# Patient Record
Sex: FEMALE | Race: WHITE | NOT HISPANIC OR LATINO | Employment: OTHER | URBAN - METROPOLITAN AREA
[De-identification: names, ages, dates, MRNs, and addresses within clinical notes are randomized per-mention and may not be internally consistent; named-entity substitution may affect disease eponyms.]

---

## 2017-01-20 ENCOUNTER — TELEPHONE (OUTPATIENT)
Dept: OBGYN | Facility: CLINIC | Age: 52
End: 2017-01-20

## 2017-01-20 NOTE — TELEPHONE ENCOUNTER
"Called pt called back. Pt stated she had her period last week and she started having a \"very light\" period flow again yesterday. Pt states this has no happened to her in past. Pt denies passing clots, abdominal pain, bloating or cramping. Pt was wondering if abnormal periods were normal during pre-menopause. Informed pt that abnormal periods do happen during pre-menopause and that she could come in to discuss her symptoms with a provider. Pt stated she would wait and see \"how things go\" over the weekend and would call back if she is still bleeding next week. Informed pt if her bleeding becomes heavy, she is passing multiple clots, or becomes dizzy or lightheaded to go into ER to be seen. Pt stated understanding and had no further questions.   "

## 2017-01-20 NOTE — TELEPHONE ENCOUNTER
Reason for Call:  Other call back    Detailed comments: Patient would like to discuss   Weird things happening with her cycle     Phone Number Patient can be reached at: Home number on file [phone] (home)    Best Time: Anytime    Can we leave a detailed message on this number? YES    Call taken on 1/20/2017 at 10:00 AM by Cornell Bateman

## 2017-04-03 ENCOUNTER — TELEPHONE (OUTPATIENT)
Dept: OBGYN | Facility: CLINIC | Age: 52
End: 2017-04-03

## 2017-04-03 NOTE — TELEPHONE ENCOUNTER
Pt wants referral for daughter Annita. Pt 's daughter is struggling with depression, working nights, and gone through a break-up. Pt is concerned and wants her seen as soon as possible. Reviewed with Dr. Wellington who recommended that pt's daughter look at her insurance and that is what dictates which counselor to see. Dr. Wellington also stated Fabienne See which was shared with pt.

## 2017-04-03 NOTE — TELEPHONE ENCOUNTER
No note about mental health referral in pt's chart. Note routed to Jaqueline farmer to place referral for pt?

## 2017-10-02 ENCOUNTER — RADIANT APPOINTMENT (OUTPATIENT)
Dept: BONE DENSITY | Facility: CLINIC | Age: 52
End: 2017-10-02
Payer: COMMERCIAL

## 2017-10-02 ENCOUNTER — RADIANT APPOINTMENT (OUTPATIENT)
Dept: MAMMOGRAPHY | Facility: CLINIC | Age: 52
End: 2017-10-02
Payer: COMMERCIAL

## 2017-10-02 ENCOUNTER — OFFICE VISIT (OUTPATIENT)
Dept: OBGYN | Facility: CLINIC | Age: 52
End: 2017-10-02
Payer: COMMERCIAL

## 2017-10-02 VITALS
BODY MASS INDEX: 19.12 KG/M2 | SYSTOLIC BLOOD PRESSURE: 106 MMHG | DIASTOLIC BLOOD PRESSURE: 56 MMHG | WEIGHT: 112 LBS | HEIGHT: 64 IN

## 2017-10-02 DIAGNOSIS — Z01.419 ENCOUNTER FOR GYNECOLOGICAL EXAMINATION WITHOUT ABNORMAL FINDING: Primary | ICD-10-CM

## 2017-10-02 DIAGNOSIS — Z78.0 ASYMPTOMATIC POSTMENOPAUSAL STATE: ICD-10-CM

## 2017-10-02 DIAGNOSIS — F33.1 MODERATE EPISODE OF RECURRENT MAJOR DEPRESSIVE DISORDER (H): ICD-10-CM

## 2017-10-02 DIAGNOSIS — Z12.31 VISIT FOR SCREENING MAMMOGRAM: ICD-10-CM

## 2017-10-02 PROCEDURE — G0202 SCR MAMMO BI INCL CAD: HCPCS | Mod: TC

## 2017-10-02 PROCEDURE — 87624 HPV HI-RISK TYP POOLED RSLT: CPT | Performed by: OBSTETRICS & GYNECOLOGY

## 2017-10-02 PROCEDURE — G0145 SCR C/V CYTO,THINLAYER,RESCR: HCPCS | Performed by: OBSTETRICS & GYNECOLOGY

## 2017-10-02 PROCEDURE — 77080 DXA BONE DENSITY AXIAL: CPT | Performed by: OBSTETRICS & GYNECOLOGY

## 2017-10-02 PROCEDURE — 99396 PREV VISIT EST AGE 40-64: CPT | Performed by: OBSTETRICS & GYNECOLOGY

## 2017-10-02 ASSESSMENT — ANXIETY QUESTIONNAIRES
6. BECOMING EASILY ANNOYED OR IRRITABLE: MORE THAN HALF THE DAYS
5. BEING SO RESTLESS THAT IT IS HARD TO SIT STILL: NOT AT ALL
3. WORRYING TOO MUCH ABOUT DIFFERENT THINGS: NOT AT ALL
2. NOT BEING ABLE TO STOP OR CONTROL WORRYING: NOT AT ALL
7. FEELING AFRAID AS IF SOMETHING AWFUL MIGHT HAPPEN: NOT AT ALL
GAD7 TOTAL SCORE: 2
IF YOU CHECKED OFF ANY PROBLEMS ON THIS QUESTIONNAIRE, HOW DIFFICULT HAVE THESE PROBLEMS MADE IT FOR YOU TO DO YOUR WORK, TAKE CARE OF THINGS AT HOME, OR GET ALONG WITH OTHER PEOPLE: SOMEWHAT DIFFICULT
1. FEELING NERVOUS, ANXIOUS, OR ON EDGE: NOT AT ALL

## 2017-10-02 ASSESSMENT — PATIENT HEALTH QUESTIONNAIRE - PHQ9
5. POOR APPETITE OR OVEREATING: NOT AT ALL
SUM OF ALL RESPONSES TO PHQ QUESTIONS 1-9: 0

## 2017-10-02 NOTE — LETTER
October 11, 2017    Lauren Stanley  [address] [address]    Dear Lauren,  We are happy to inform you that your PAP smear result from 10/2/17 is normal.  We are now able to do a follow up test on PAP smears. The DNA test is for HPV (Human Papilloma Virus). Cervical cancer is closely linked with certain types of HPV. Your result showed no evidence of high risk HPV.  Therefore we recommend you return in 3 years for your next pap smear.  You will still need to return to the clinic every year for an annual exam and other preventive tests.  Please contact the clinic at [phone] with any questions.  Sincerely,    Brandon Wellington MD/marin

## 2017-10-02 NOTE — MR AVS SNAPSHOT
"              After Visit Summary   10/2/2017    Lauren Stanley    MRN: [id number]           Patient Information     Date Of Birth          1965        Visit Information        Provider Department      10/2/2017 11:30 AM Brandon Wellington MD [facility] [place]        Today's Diagnoses     Encounter for gynecological examination without abnormal finding    -  1    Moderate episode of recurrent major depressive disorder (H)           Follow-ups after your visit        Who to contact     If you have questions or need follow up information about today's clinic visit or your schedule please contact [facility] directly at [phone].  Normal or non-critical lab and imaging results will be communicated to you by MyChart, letter or phone within 4 business days after the clinic has received the results. If you do not hear from us within 7 days, please contact the clinic through [employer] or phone. If you have a critical or abnormal lab result, we will notify you by phone as soon as possible.  Submit refill requests through [employer] or call your pharmacy and they will forward the refill request to us. Please allow 3 business days for your refill to be completed.          Additional Information About Your Visit        MyChart Information     [employer] lets you send messages to your doctor, view your test results, renew your prescriptions, schedule appointments and more. To sign up, go to [email or web address] . Click on \"Log in\" on the left side of the screen, which will take you to the Welcome page. Then click on \"Sign up Now\" on the right side of the page.     You will be asked to enter the access code listed below, as well as some personal information. Please follow the directions to create your username and password.     Your access code is: [email or web address]  Expires: 2018  8:35 AM     Your access code will  in 90 days. If you need help or a new code, please call your [place] " "clinic or [phone].        Care EveryWhere ID     This is your Care EveryWhere ID. This could be used by other organizations to access your [place] medical records  [id number]        Your Vitals Were     Height Last Period Breastfeeding? BMI (Body Mass Index)          5' 4\" (1.626 m) 09/17/2017 (Exact Date) No 19.22 kg/m2         Blood Pressure from Last 3 Encounters:   10/02/17 106/56   09/21/16 (!) 88/54   09/15/15 (!) 86/52    Weight from Last 3 Encounters:   10/02/17 112 lb (50.8 kg)   09/21/16 110 lb (49.9 kg)   09/15/15 109 lb (49.4 kg)              We Performed the Following     HPV High Risk Types DNA Cervical     Pap imaged thin layer screen with HPV - recommended age 30 - 65          Today's Medication Changes          These changes are accurate as of: 10/2/17 11:59 PM.  If you have any questions, ask your nurse or doctor.               These medicines have changed or have updated prescriptions.        Dose/Directions    sertraline 50 MG tablet   Commonly known as:  ZOLOFT   This may have changed:  how much to take   Used for:  Moderate episode of recurrent major depressive disorder (H)        Dose:  50 mg   Take 1 tablet (50 mg) by mouth daily   Quantity:  90 tablet   Refills:  3            Where to get your medicines      These medications were sent to [place] [id number] [place] [address]  [address] [place] [address] [facility] [address]     Phone:  [phone]     sertraline 50 MG tablet                Primary Care Provider    None Specified       No primary provider on file.        Equal Access to Services     [facility] : Hadpeyton Beckett, waarsalanda luqadaha, qaybta kaaljcarlos simpson. So [facility] [phone].    ATENCIÓN: Si habla español, tiene a polk disposición servicios gratuitos de asistencia lingüística. Llame al [phone].    We comply with applicable federal civil rights laws and [place] laws. We do not discriminate " on the basis of race, color, national origin, age, disability, sex, sexual orientation, or gender identity.            Thank you!     Thank you for choosing [facility]  for your care. Our goal is always to provide you with excellent care. Hearing back from our patients is one way we can continue to improve our services. Please take a few minutes to complete the written survey that you may receive in the mail after your visit with us. Thank you!             Your Updated Medication List - Protect others around you: Learn how to safely use, store and throw away your medicines at www.disposemymeds.org.          This list is accurate as of: 10/2/17 11:59 PM.  Always use your most recent med list.                   Brand Name Dispense Instructions for use Diagnosis    sertraline 50 MG tablet    ZOLOFT    90 tablet    Take 1 tablet (50 mg) by mouth daily    Moderate episode of recurrent major depressive disorder (H)

## 2017-10-02 NOTE — PROGRESS NOTES
Lauren is a 52 year old  female who presents for annual exam.     Besides routine health maintenance, she has no other health concerns today .    HPI: The patient is seen at this time for annual exam. Her only complaint is some definite Tory dysfunction with pressure symptoms but only having small pellets come out and incomplete emptying.  The patient does not have a PCP.        GYNECOLOGIC HISTORY:    Patient's last menstrual period was 2017 (exact date).  Her current contraception method is: none.  She  reports that she has never smoked. She has never used smokeless tobacco.      Patient is sexually active.  STD testing offered?  Declined  Last PHQ-9 score on record = PHQ-9 SCORE 10/2/2017   Total Score 0     Last GAD7 score on record =   ERNESTO-7 SCORE 10/2/2017   Total Score 2     Alcohol Score = 0    HEALTH MAINTENANCE:  Cholesterol:   Cholesterol   Date Value Ref Range Status   2014 197 115 - 199 mg/dL Final    NA  Last Mammo: one year ago, Result: normal, Next Mammo: today   Pap: (  Lab Results   Component Value Date    PAP OTHER-NIL, See Result 2016    PAP NIL 09/15/2015    9/21/16 WNL   Colonoscopy: 2-3 years ago with Dr. Jones , Result: normal, Next Colonoscopy: 5 years.  Dexa:  Today    Health maintenance updated:  yes    HISTORY:  Obstetric History       T0      L2     SAB0   TAB0   Ectopic0   Multiple0   Live Births2       # Outcome Date GA Lbr Beni/2nd Weight Sex Delivery Anes PTL Lv   2   35w0d    Vag-Spont   NONI   1   35w0d    Vag-Spont   NONI          Patient Active Problem List   Diagnosis     NO ACTIVE PROBLEMS     Past Surgical History:   Procedure Laterality Date     RECTOCELE REPAIR        Social History   Substance Use Topics     Smoking status: Never Smoker     Smokeless tobacco: Never Used     Alcohol use No      Problem (# of Occurrences) Relation (Name,Age of Onset)    Colon Cancer (1) Maternal Grandfather    DIABETES (1) Father        "Negative family history of: Coronary Artery Disease            Current Outpatient Prescriptions   Medication Sig     sertraline (ZOLOFT) 50 MG tablet Take 1 tablet (50 mg) by mouth daily (Patient taking differently: Take 25 mg by mouth daily )     No current facility-administered medications for this visit.      No Known Allergies    Past medical, surgical, social and family histories were reviewed and updated in EPIC.    ROS:   Gastrointestinal: Constipation    EXAM:  /56  Ht 5' 4\" (1.626 m)  Wt 112 lb (50.8 kg)  LMP 09/17/2017 (Exact Date)  Breastfeeding? No  BMI 19.22 kg/m2   BMI: Body mass index is 19.22 kg/(m^2).    PHYSICAL EXAM:  Constitutional:  Appearance: Well nourished, well developed, alert, in no acute distress  Neck:  Lymph Nodes:  No lymphadenopathy present    Thyroid:  Gland size normal, nontender, no nodules or masses present  on palpation  Chest:  Respiratory Effort:  Breathing unlabored  Cardiovascular:    Heart: Auscultation:  Regular rate, normal rhythm, no murmurs present  Breasts: Inspection of Breasts:  No lymphadenopathy present., Palpation of Breasts and Axillae:  No masses present on palpation, no breast tenderness., Axillary Lymph Nodes:  No lymphadenopathy present. and No nodularity, asymmetry or nipple discharge bilaterally.  Gastrointestinal:   Abdominal Examination:  Abdomen nontender to palpation, tone normal without rigidity or guarding, no masses present, umbilicus without lesions   Liver and Spleen:  No hepatomegaly present, liver nontender to palpation    Hernias:  No hernias present  Lymphatic: Lymph Nodes:  No other lymphadenopathy present  Skin:  General Inspection:  No rashes present, no lesions present, no areas of  discoloration    Genitalia and Groin:  No rashes present, no lesions present, no areas of  discoloration, no masses present  Neurologic/Psychiatric:    Mental Status:  Oriented X3     Pelvic Exam:  External Genitalia:     Normal appearance for age, no " discharge present, no tenderness present, no inflammatory lesions present, color normal  Vagina:     Normal vaginal vault without central or paravaginal defects, no discharge present, no inflammatory lesions present, no masses present  Bladder:     Nontender to palpation  Urethra:   Urethral Body:  Urethra palpation normal, urethra structural support normal   Urethral Meatus:  No erythema or lesions present  Cervix:     Appearance healthy, no lesions present, nontender to palpation, no bleeding present  Uterus:     Uterus: firm, normal sized and nontender, midplane in position.   Adnexa:     No adnexal tenderness present, no adnexal masses present  Perineum:     Perineum within normal limits, no evidence of trauma, no rashes or skin lesions present  Anus:     Anus within normal limits, no hemorrhoids present  Inguinal Lymph Nodes:     No lymphadenopathy present  Pubic Hair:     Normal pubic hair distribution for age  Genitalia and Groin:     No rashes present, no lesions present, no areas of discoloration, no masses present      COUNSELING:   Reviewed preventive health counseling, as reflected in patient instructions       Regular exercise       Healthy diet/nutrition    BMI: Body mass index is 19.22 kg/(m^2).        ASSESSMENT:  52 year old female with satisfactory annual exam. Bone density shows -1. at the spine and -1.2 with a hip below    ICD-10-CM    1. Encounter for gynecological examination without abnormal finding Z01.419 Pap imaged thin layer screen with HPV - recommended age 30 - 65     HPV High Risk Types DNA Cervical   2. Moderate episode of recurrent major depressive disorder (H) F33.1 sertraline (ZOLOFT) 50 MG tablet       PLAN: Patient with excellent examination. She does have osteopenia and calcium vitamin D and exercise were related. She does need to have some MiraLAX to soften up her stools and see if she has better emptying.      Brandon Wellington MD

## 2017-10-03 ASSESSMENT — ANXIETY QUESTIONNAIRES: GAD7 TOTAL SCORE: 2

## 2017-10-04 LAB
COPATH REPORT: NORMAL
PAP: NORMAL

## 2017-10-09 LAB
FINAL DIAGNOSIS: NORMAL
HPV HR 12 DNA CVX QL NAA+PROBE: NEGATIVE
HPV16 DNA SPEC QL NAA+PROBE: NEGATIVE
HPV18 DNA SPEC QL NAA+PROBE: NEGATIVE
SPECIMEN DESCRIPTION: NORMAL

## 2017-11-07 ENCOUNTER — TELEPHONE (OUTPATIENT)
Dept: NURSING | Facility: CLINIC | Age: 52
End: 2017-11-07

## 2017-11-07 NOTE — TELEPHONE ENCOUNTER
Talked with DANIA Oden in person and she states that she doesn't recommend being people getting the shingles booster until 55-60 because it's a one time shot and that's it, so if gets shingles later she won't be able to receive another shingles shot. Pt states 3 of her friends have had shingles, one at age 42 and the other two at her age and that they got the shot. Relayed to pt that the shingles vaccine is not recommended until age 65 because at the time pt's immune systems are lower too, versus now at her age being healthy and more likely to be able to fight it off. The vaccine helps so the shingles outbreaks are not as bad. Pt verbalized understanding and states for right now she will hold off. Informed if she has any other questions to let us know. Pt verbalized understanding.        Closing encounter.

## 2017-11-07 NOTE — TELEPHONE ENCOUNTER
Pt calling stating she saw Dr. Wellington for her annual on 10/2/17 and forgot to ask him about getting the shingles vaccine. States she has never had the shingles vaccine before and did have chickenpox as a child. Routing to DANIA Oden to review/advise.

## 2017-12-19 ENCOUNTER — TRANSFERRED RECORDS (OUTPATIENT)
Dept: HEALTH INFORMATION MANAGEMENT | Facility: CLINIC | Age: 52
End: 2017-12-19

## 2017-12-26 ENCOUNTER — TELEPHONE (OUTPATIENT)
Dept: NURSING | Facility: CLINIC | Age: 52
End: 2017-12-26

## 2017-12-26 NOTE — TELEPHONE ENCOUNTER
Pt had labs drawn through work and wants to have them entered in to her chart. Pt gave instructions on how to get results.   [employer]/results  Step one go to link. Enter [email or web address]  Password [email or web address]  Unable to get into site. Informed pt. Pt stated she will mail the results to the office. attenetion Dr. Wellington.

## 2018-02-01 ENCOUNTER — TELEPHONE (OUTPATIENT)
Dept: OBGYN | Facility: CLINIC | Age: 53
End: 2018-02-01

## 2018-02-01 NOTE — TELEPHONE ENCOUNTER
"Called pt back. She had an EKG done and has an abnormality on her EKG she needs to go over with EB to see if she needs a cardio workup.     She and her  are changing life insurance carriers and had a \"physical\" for this life insurance policy.    She is emailing me her EKG report    EB called her and recommended that she see an Internist.  "

## 2018-02-01 NOTE — TELEPHONE ENCOUNTER
Had a physical with Dr SORIA on 10/2017 and got insurance forms sent to her insurance and they are saying that they see an abnormality and want her to see a what cardiac work up should be done. Dr SORIA is her only dr.       Please advise, this may need to go over Dr. SORIA himself.

## 2018-02-09 ENCOUNTER — OFFICE VISIT (OUTPATIENT)
Dept: FAMILY MEDICINE | Facility: CLINIC | Age: 53
End: 2018-02-09
Payer: COMMERCIAL

## 2018-02-09 VITALS
TEMPERATURE: 96.7 F | BODY MASS INDEX: 19.63 KG/M2 | HEIGHT: 64 IN | DIASTOLIC BLOOD PRESSURE: 65 MMHG | WEIGHT: 115 LBS | SYSTOLIC BLOOD PRESSURE: 99 MMHG | HEART RATE: 62 BPM | OXYGEN SATURATION: 99 %

## 2018-02-09 DIAGNOSIS — R94.31 ABNORMAL ELECTROCARDIOGRAM: Primary | ICD-10-CM

## 2018-02-09 PROCEDURE — 99202 OFFICE O/P NEW SF 15 MIN: CPT | Mod: 25 | Performed by: INTERNAL MEDICINE

## 2018-02-09 PROCEDURE — 93010 ELECTROCARDIOGRAM REPORT: CPT | Performed by: INTERNAL MEDICINE

## 2018-02-09 ASSESSMENT — ENCOUNTER SYMPTOMS
SHORTNESS OF BREATH: 0
PND: 0
CLAUDICATION: 0
PALPITATIONS: 0
ORTHOPNEA: 0
COUGH: 0
LOSS OF CONSCIOUSNESS: 0

## 2018-02-09 NOTE — PATIENT INSTRUCTIONS
Maintain low fat/calorie diet and regular exercise.    Seek immediate medical attention if you develop any chest pain/tightness, shortness of breath, or any other unusual symptoms.

## 2018-02-09 NOTE — PROGRESS NOTES
"HPI    SUBJECTIVE:   Lauren Stanley is a 52 year old female who presents to clinic today for the following health issues:      Patient was having an examination for life insurance application and an EKG performed showed inverted T waves in leads V1 to V3. Patient was advised to consult with us for further evaluation. Patient denies any chest pain, palpitations, shortness of breath, or any other unusual symptoms. She exercises regularly. No smoking history.  Has family history of coronary artery disease.       Past Medical History:   Diagnosis Date     NO ACTIVE PROBLEMS (aka NONE)      Colonoscopy: 2016, [facility], normal      Review of Systems   Respiratory: Negative for cough and shortness of breath.    Cardiovascular: Negative for chest pain, palpitations, orthopnea, claudication, leg swelling and PND.   Neurological: Negative for loss of consciousness.       BP 99/65 (BP Location: Right arm, Cuff Size: Adult Regular)  Pulse 62  Temp 96.7  F (35.9  C) (Oral)  Ht 5' 4\" (1.626 m)  Wt 115 lb (52.2 kg)  SpO2 99%  BMI 19.74 kg/m2      Physical Exam   Constitutional: She is oriented to person, place, and time. No distress.   Neck: No thyromegaly present.   Cardiovascular: Normal rate, regular rhythm and normal heart sounds.    Pulmonary/Chest: Effort normal and breath sounds normal. No respiratory distress.   Neurological: She is alert and oriented to person, place, and time. GCS score is 15.   Vitals reviewed.        ICD-10-CM    1. Abnormal electrocardiogram R94.31 EKG 12-lead complete w/read - Clinics    Clinic EKG is normal; letter stating normal EKG and examination printed and given to patient       Patient Instructions   Maintain low fat/calorie diet and regular exercise.    Seek immediate medical attention if you develop any chest pain/tightness, shortness of breath, or any other unusual symptoms.        "

## 2018-02-09 NOTE — NURSING NOTE
"Chief Complaint   Patient presents with     RECHECK     abnormal EKG       Initial BP 99/65 (BP Location: Right arm, Cuff Size: Adult Regular)  Pulse 62  Temp 96.7  F (35.9  C) (Oral)  Ht 5' 4\" (1.626 m)  Wt 115 lb (52.2 kg)  SpO2 99%  BMI 19.74 kg/m2 Estimated body mass index is 19.74 kg/(m^2) as calculated from the following:    Height as of this encounter: 5' 4\" (1.626 m).    Weight as of this encounter: 115 lb (52.2 kg).  Medication Reconciliation: complete     Marina Garcia MA    "

## 2018-02-09 NOTE — LETTER
February 9, 2018      To whom it may concern,    I have seen and examined Lauren Stanley.  Her EKG at the clinic shows normal sinus rhythm without any ischemic changes or rhythm abnormalities.  Her EKG is essentially normal.          Dr. Yvon Lora

## 2018-02-09 NOTE — MR AVS SNAPSHOT
"              After Visit Summary   2/9/2018    Lauren Stanley    MRN: [id number]           Patient Information     Date Of Birth          1965        Visit Information        Provider Department      2/9/2018 12:00 PM Yvon Lora MD [facility]        Today's Diagnoses     Abnormal electrocardiogram    -  1      Care Instructions    Maintain low fat/calorie diet and regular exercise.    Seek immediate medical attention if you develop any chest pain/tightness, shortness of breath, or any other unusual symptoms.          Follow-ups after your visit        Who to contact     If you have questions or need follow up information about today's clinic visit or your schedule please contact [facility] directly at [phone].  Normal or non-critical lab and imaging results will be communicated to you by MyChart, letter or phone within 4 business days after the clinic has received the results. If you do not hear from us within 7 days, please contact the clinic through [employer] or phone. If you have a critical or abnormal lab result, we will notify you by phone as soon as possible.  Submit refill requests through [employer] or call your pharmacy and they will forward the refill request to us. Please allow 3 business days for your refill to be completed.          Additional Information About Your Visit        MyChart Information     [employer] lets you send messages to your doctor, view your test results, renew your prescriptions, schedule appointments and more. To sign up, go to [email or web address] . Click on \"Log in\" on the left side of the screen, which will take you to the Welcome page. Then click on \"Sign up Now\" on the right side of the page.     You will be asked to enter the access code listed below, as well as some personal information. Please follow the directions to create your username and password.     Your access code is: [email or web address]  Expires: 5/10/2018 12:19 PM   " "  Your access code will  in 90 days. If you need help or a new code, please call your [place] clinic or [phone].        Care EveryWhere ID     This is your Care EveryWhere ID. This could be used by other organizations to access your [place] medical records  [id number]        Your Vitals Were     Pulse Temperature Height Pulse Oximetry BMI (Body Mass Index)       62 96.7  F (35.9  C) (Oral) 5' 4\" (1.626 m) 99% 19.74 kg/m2        Blood Pressure from Last 3 Encounters:   18 99/65   10/02/17 106/56   16 (!) 88/54    Weight from Last 3 Encounters:   18 115 lb (52.2 kg)   10/02/17 112 lb (50.8 kg)   16 110 lb (49.9 kg)              We Performed the Following     EKG 12-lead complete w/read - Clinics          Today's Medication Changes          These changes are accurate as of 18 12:19 PM.  If you have any questions, ask your nurse or doctor.               These medicines have changed or have updated prescriptions.        Dose/Directions    sertraline 50 MG tablet   Commonly known as:  ZOLOFT   This may have changed:  how much to take   Used for:  Moderate episode of recurrent major depressive disorder (H)        Dose:  50 mg   Take 1 tablet (50 mg) by mouth daily   Quantity:  90 tablet   Refills:  3                Primary Care Provider    None Specified       No primary provider on file.        Equal Access to Services     [facility]: Hadpeyton Beckett, waaxda jimmy, qaybta kaalmajcarlos tan . So [facility] [phone].    ATENCIÓN: Si habla español, tiene a polk disposición servicios gratuitos de asistencia lingüística. Llame al [phone].    We comply with applicable federal civil rights laws and [place] laws. We do not discriminate on the basis of race, color, national origin, age, disability, sex, sexual orientation, or gender identity.            Thank you!     Thank you for choosing [facility]  for your " care. Our goal is always to provide you with excellent care. Hearing back from our patients is one way we can continue to improve our services. Please take a few minutes to complete the written survey that you may receive in the mail after your visit with us. Thank you!             Your Updated Medication List - Protect others around you: Learn how to safely use, store and throw away your medicines at www.disposemymeds.org.          This list is accurate as of 2/9/18 12:19 PM.  Always use your most recent med list.                   Brand Name Dispense Instructions for use Diagnosis    sertraline 50 MG tablet    ZOLOFT    90 tablet    Take 1 tablet (50 mg) by mouth daily    Moderate episode of recurrent major depressive disorder (H)

## 2018-03-05 ENCOUNTER — MYC MEDICAL ADVICE (OUTPATIENT)
Dept: FAMILY MEDICINE | Facility: CLINIC | Age: 53
End: 2018-03-05

## 2018-03-05 ENCOUNTER — TELEPHONE (OUTPATIENT)
Dept: FAMILY MEDICINE | Facility: CLINIC | Age: 53
End: 2018-03-05

## 2018-03-05 NOTE — TELEPHONE ENCOUNTER
"FYI to PCP:   Spoke with patient:   Had a \"Life Insurance Physical\" and they had an irregular finding with her EKG and advised she see her PCP   Was in 2/9/18 and there were no irregular findings on her repeat EKG   PCP sent a letter to her [employer], but they told her they are challenging that and need more information     Advised pt fax a copy of this letter  Pt does not have a way to fax   Pt will be setting up [employer] and will upload a copy of the letter her [employer] mailed her     Mitzi SWAIN RN    "

## 2018-03-05 NOTE — TELEPHONE ENCOUNTER
Please review [email or web address] message and advise as appropriate.    Thank you,    Xi Chambers, RN  [employer]

## 2018-03-05 NOTE — TELEPHONE ENCOUNTER
Reason for Call:  Other appointment    Detailed comments: PT had an ekg done on 2/9.  Her life insurance company has some questions regarding this.  Please contact the pt for the details.    Phone Number Patient can be reached at: Home number on file [phone] (home)    Best Time: any    Can we leave a detailed message on this number? YES    Call taken on 3/5/2018 at 3:07 PM by Yaquelin Whaley

## 2018-03-06 ENCOUNTER — MYC MEDICAL ADVICE (OUTPATIENT)
Dept: FAMILY MEDICINE | Facility: CLINIC | Age: 53
End: 2018-03-06

## 2018-03-06 ENCOUNTER — MEDICAL CORRESPONDENCE (OUTPATIENT)
Dept: HEALTH INFORMATION MANAGEMENT | Facility: CLINIC | Age: 53
End: 2018-03-06

## 2018-04-17 ENCOUNTER — MYC MEDICAL ADVICE (OUTPATIENT)
Dept: FAMILY MEDICINE | Facility: CLINIC | Age: 53
End: 2018-04-17

## 2018-04-17 NOTE — TELEPHONE ENCOUNTER
Dr. Lora,    Please review BOTH mychart message from this patient. This is Part 1.     April Graf RN

## 2018-04-23 NOTE — TELEPHONE ENCOUNTER
Dr Lora,  Pt calling to check in as she has not received a response.  Please view both [employer] messages from 4/17 and respond.  Thank you.

## 2018-05-14 ENCOUNTER — HOSPITAL ENCOUNTER (OUTPATIENT)
Facility: CLINIC | Age: 53
Discharge: HOME | End: 2018-05-14
Admitting: INTERNAL MEDICINE
Payer: COMMERCIAL

## 2018-05-14 ENCOUNTER — HOSPITAL ENCOUNTER (OUTPATIENT)
Dept: NUCLEAR MEDICINE | Facility: CLINIC | Age: 53
Setting detail: NUCLEAR MEDICINE
End: 2018-05-14
Attending: INTERNAL MEDICINE | Admitting: COLON & RECTAL SURGERY
Payer: COMMERCIAL

## 2018-05-14 ENCOUNTER — HOSPITAL ENCOUNTER (OUTPATIENT)
Dept: CARDIOLOGY | Facility: CLINIC | Age: 53
End: 2018-05-14
Attending: INTERNAL MEDICINE | Admitting: COLON & RECTAL SURGERY
Payer: COMMERCIAL

## 2018-05-14 DIAGNOSIS — R94.31 ABNORMAL ECG: ICD-10-CM

## 2018-05-14 PROCEDURE — 93016 CV STRESS TEST SUPVJ ONLY: CPT | Performed by: INTERNAL MEDICINE

## 2018-05-14 PROCEDURE — A9502 TC99M TETROFOSMIN: HCPCS | Performed by: INTERNAL MEDICINE

## 2018-05-14 PROCEDURE — 93018 CV STRESS TEST I&R ONLY: CPT | Performed by: INTERNAL MEDICINE

## 2018-05-14 PROCEDURE — 78452 HT MUSCLE IMAGE SPECT MULT: CPT

## 2018-05-14 PROCEDURE — 93017 CV STRESS TEST TRACING ONLY: CPT

## 2018-05-14 PROCEDURE — 78452 HT MUSCLE IMAGE SPECT MULT: CPT | Mod: 26 | Performed by: INTERNAL MEDICINE

## 2018-05-14 PROCEDURE — 34300033 ZZH RX 343: Performed by: INTERNAL MEDICINE

## 2018-05-14 RX ADMIN — TETROFOSMIN 25.8 MCI.: 1.38 INJECTION, POWDER, LYOPHILIZED, FOR SOLUTION INTRAVENOUS at 11:18

## 2018-05-14 RX ADMIN — TETROFOSMIN 9 MCI.: 1.38 INJECTION, POWDER, LYOPHILIZED, FOR SOLUTION INTRAVENOUS at 09:17

## 2018-06-07 ENCOUNTER — MYC MEDICAL ADVICE (OUTPATIENT)
Dept: FAMILY MEDICINE | Facility: CLINIC | Age: 53
End: 2018-06-07

## 2018-06-19 ENCOUNTER — TELEPHONE (OUTPATIENT)
Dept: NURSING | Facility: CLINIC | Age: 53
End: 2018-06-19

## 2018-06-19 NOTE — TELEPHONE ENCOUNTER
Pt calling in,  Reports she needs sertraline refill,   Per med chart, pt should have enough refills to get through to 10/2018. Reviewed that I would contact pt's pharmacy and follow-up or have pharmacy confirm med is ready. Pt verbalized understanding and had no other needs at this time.  Called pt's pharmacy- they confirmed that they do have plenty of refills for pt (1 year Rx) and there is just a new code for the medication they report pt is not likely to be able to see. They are filling the Rx for patient and will contact her to let her know it's ready.

## 2018-10-08 ASSESSMENT — ENCOUNTER SYMPTOMS
INCREASED ENERGY: 0
FATIGUE: 0
MUSCLE WEAKNESS: 0
WEIGHT GAIN: 0
JOINT SWELLING: 0
POLYPHAGIA: 0
NECK PAIN: 0
CHILLS: 0
STIFFNESS: 0
FEVER: 0
HALLUCINATIONS: 0
POLYDIPSIA: 0
DECREASED APPETITE: 0
MYALGIAS: 0
MUSCLE CRAMPS: 0
WEIGHT LOSS: 0
BACK PAIN: 1
ARTHRALGIAS: 0
NIGHT SWEATS: 1
ALTERED TEMPERATURE REGULATION: 0

## 2018-10-11 ENCOUNTER — RADIANT APPOINTMENT (OUTPATIENT)
Dept: MAMMOGRAPHY | Facility: CLINIC | Age: 53
End: 2018-10-11
Payer: COMMERCIAL

## 2018-10-11 ENCOUNTER — OFFICE VISIT (OUTPATIENT)
Dept: OBGYN | Facility: CLINIC | Age: 53
End: 2018-10-11
Payer: COMMERCIAL

## 2018-10-11 VITALS
WEIGHT: 111.4 LBS | HEART RATE: 60 BPM | HEIGHT: 64 IN | SYSTOLIC BLOOD PRESSURE: 108 MMHG | DIASTOLIC BLOOD PRESSURE: 60 MMHG | BODY MASS INDEX: 19.02 KG/M2

## 2018-10-11 DIAGNOSIS — Z12.31 VISIT FOR SCREENING MAMMOGRAM: ICD-10-CM

## 2018-10-11 DIAGNOSIS — F33.1 MODERATE EPISODE OF RECURRENT MAJOR DEPRESSIVE DISORDER (H): ICD-10-CM

## 2018-10-11 DIAGNOSIS — Z23 NEED FOR PROPHYLACTIC VACCINATION AND INOCULATION AGAINST INFLUENZA: ICD-10-CM

## 2018-10-11 DIAGNOSIS — Z01.419 ENCOUNTER FOR GYNECOLOGICAL EXAMINATION WITHOUT ABNORMAL FINDING: Primary | ICD-10-CM

## 2018-10-11 PROCEDURE — G0145 SCR C/V CYTO,THINLAYER,RESCR: HCPCS | Performed by: OBSTETRICS & GYNECOLOGY

## 2018-10-11 PROCEDURE — 90471 IMMUNIZATION ADMIN: CPT | Performed by: OBSTETRICS & GYNECOLOGY

## 2018-10-11 PROCEDURE — 90686 IIV4 VACC NO PRSV 0.5 ML IM: CPT | Performed by: OBSTETRICS & GYNECOLOGY

## 2018-10-11 PROCEDURE — 99396 PREV VISIT EST AGE 40-64: CPT | Mod: 25 | Performed by: OBSTETRICS & GYNECOLOGY

## 2018-10-11 PROCEDURE — 77067 SCR MAMMO BI INCL CAD: CPT | Mod: TC

## 2018-10-11 PROCEDURE — 77063 BREAST TOMOSYNTHESIS BI: CPT | Mod: TC

## 2018-10-11 PROCEDURE — 87624 HPV HI-RISK TYP POOLED RSLT: CPT | Performed by: OBSTETRICS & GYNECOLOGY

## 2018-10-11 RX ORDER — SERTRALINE HYDROCHLORIDE 25 MG/1
25 TABLET, FILM COATED ORAL DAILY
Qty: 90 TABLET | Refills: 3 | Status: CANCELLED | OUTPATIENT
Start: 2018-10-31

## 2018-10-11 ASSESSMENT — ANXIETY QUESTIONNAIRES
3. WORRYING TOO MUCH ABOUT DIFFERENT THINGS: NOT AT ALL
2. NOT BEING ABLE TO STOP OR CONTROL WORRYING: NOT AT ALL
GAD7 TOTAL SCORE: 1
5. BEING SO RESTLESS THAT IT IS HARD TO SIT STILL: NOT AT ALL
1. FEELING NERVOUS, ANXIOUS, OR ON EDGE: SEVERAL DAYS
6. BECOMING EASILY ANNOYED OR IRRITABLE: NOT AT ALL
7. FEELING AFRAID AS IF SOMETHING AWFUL MIGHT HAPPEN: NOT AT ALL

## 2018-10-11 ASSESSMENT — PATIENT HEALTH QUESTIONNAIRE - PHQ9: 5. POOR APPETITE OR OVEREATING: NOT AT ALL

## 2018-10-11 NOTE — LETTER
October 24, 2018    Lauren Stanley  [address] [address]    Dear Lauren,  We are happy to inform you that your PAP smear result from 10/11/18 is normal.  We are now able to do a follow up test on PAP smears. The DNA test is for HPV (Human Papilloma Virus). Cervical cancer is closely linked with certain types of HPV. Your results showed no evidence of high risk HPV.  Therefore we recommend you return in 5 years for your next pap smear and HPV test.  You will still need to return to the clinic every year for an annual exam and other preventive tests.  If you have additional questions regarding this result, please call our registered nurse, Mitzi at [phone].  Sincerely,    Brandon Wellington MD/marin

## 2018-10-11 NOTE — MR AVS SNAPSHOT
"              After Visit Summary   10/11/2018    Lauren Stanley    MRN: [id number]           Patient Information     Date Of Birth          1965        Visit Information        Provider Department      10/11/2018 1:30 PM Brandon Wellington MD [facility] [place]        Today's Diagnoses     Encounter for gynecological examination without abnormal finding    -  1    Need for prophylactic vaccination and inoculation against influenza           Follow-ups after your visit        Who to contact     If you have questions or need follow up information about today's clinic visit or your schedule please contact [facility] directly at [phone].  Normal or non-critical lab and imaging results will be communicated to you by MyChart, letter or phone within 4 business days after the clinic has received the results. If you do not hear from us within 7 days, please contact the clinic through [employer] or phone. If you have a critical or abnormal lab result, we will notify you by phone as soon as possible.  Submit refill requests through [employer] or call your pharmacy and they will forward the refill request to us. Please allow 3 business days for your refill to be completed.          Additional Information About Your Visit        MyChart Information     [employer] gives you secure access to your electronic health record. If you see a primary care provider, you can also send messages to your care team and make appointments. If you have questions, please call your primary care clinic.  If you do not have a primary care provider, please call [phone] and they will assist you.        Care EveryWhere ID     This is your Care EveryWhere ID. This could be used by other organizations to access your [place] medical records  [id number]        Your Vitals Were     Pulse Height Last Period BMI (Body Mass Index)          60 5' 4\" (1.626 m) 10/04/2018 (Exact Date) 19.12 kg/m2         Blood Pressure from " Last 3 Encounters:   10/11/18 108/60   02/09/18 99/65   10/02/17 106/56    Weight from Last 3 Encounters:   10/11/18 111 lb 6.4 oz (50.5 kg)   02/09/18 115 lb (52.2 kg)   10/02/17 112 lb (50.8 kg)              We Performed the Following     FLU VACCINE, SPLIT VIRUS, IM (QUADRIVALENT) [85535]- >3 YRS     HPV High Risk Types DNA Cervical     Pap imaged thin layer screen with HPV - recommended age 30 - 65     Vaccine Administration, Initial [[id number]]          Today's Medication Changes          These changes are accurate as of 10/11/18  1:53 PM.  If you have any questions, ask your nurse or doctor.               These medicines have changed or have updated prescriptions.        Dose/Directions    sertraline 50 MG tablet   Commonly known as:  ZOLOFT   This may have changed:  how much to take   Used for:  Moderate episode of recurrent major depressive disorder (H)        Dose:  50 mg   Take 1 tablet (50 mg) by mouth daily   Quantity:  90 tablet   Refills:  3                Primary Care Provider    None Specified       No primary provider on file.        Equal Access to Services     [facility] Santhosh Beckett, юлия marquez, jcarlos greer . So [facility] [phone].    ATENCIÓN: Si habla español, tiene a polk disposición servicios gratuitos de asistencia lingüística. Llame al [phone].    We comply with applicable federal civil rights laws and [place] laws. We do not discriminate on the basis of race, color, national origin, age, disability, sex, sexual orientation, or gender identity.            Thank you!     Thank you for choosing [facility] [place]  for your care. Our goal is always to provide you with excellent care. Hearing back from our patients is one way we can continue to improve our services. Please take a few minutes to complete the written survey that you may receive in the mail after your visit with us. Thank you!              Your Updated Medication List - Protect others around you: Learn how to safely use, store and throw away your medicines at www.disposemymeds.org.          This list is accurate as of 10/11/18  1:53 PM.  Always use your most recent med list.                   Brand Name Dispense Instructions for use Diagnosis    sertraline 50 MG tablet    ZOLOFT    90 tablet    Take 1 tablet (50 mg) by mouth daily    Moderate episode of recurrent major depressive disorder (H)

## 2018-10-11 NOTE — PROGRESS NOTES
Lauren is a 53 year old  female who presents for annual exam.     Besides routine health maintenance, she has no other health concerns today .    HPI: The patient is seen at this time for her annual exam.  She is postmenopausal with a few night sweats but no sleep disturbance.  Her health is excellent.  No primary care provider on file.        GYNECOLOGIC HISTORY:    Patient's last menstrual period was 10/04/2018 (exact date).  Her current contraception method is: none.  She  reports that she has never smoked. She has never used smokeless tobacco.    Patient is sexually active.  STD testing offered?  Declined  Last PHQ-9 score on record =   PHQ-9 SCORE 10/11/2018   Total Score 0     Last GAD7 score on record =   ERNESTO-7 SCORE 10/11/2018   Total Score 1     Alcohol Score = 0    HEALTH MAINTENANCE:  Cholesterol: patient is not fasting for labs, had labs done in Dec 2017 for life insurance policy  Last Mammo: 10/2/17, Result: normal, Next Mammo: today   Pap: 10/2/17 neg, HPV-  Colonoscopy: , Result: normal, Next Colonoscopy: 3 years.  Dexa: 10/2/17    Health maintenance updated:  yes    HISTORY:  Obstetric History       T0      L2     SAB0   TAB0   Ectopic0   Multiple0   Live Births2       # Outcome Date GA Lbr Beni/2nd Weight Sex Delivery Anes PTL Lv   2   35w0d    Vag-Spont   NONI   1   35w0d    Vag-Spont   NONI          Patient Active Problem List   Diagnosis     NO ACTIVE PROBLEMS     Past Surgical History:   Procedure Laterality Date     RECTOCELE REPAIR        Social History   Substance Use Topics     Smoking status: Never Smoker     Smokeless tobacco: Never Used     Alcohol use No      Problem (# of Occurrences) Relation (Name,Age of Onset)    Colon Cancer (1) Maternal Grandfather    Coronary Artery Disease (1) Father    Diabetes (1) Father            Current Outpatient Prescriptions   Medication Sig     sertraline (ZOLOFT) 50 MG tablet Take 1 tablet (50 mg) by mouth daily  "(Patient taking differently: Take 25 mg by mouth daily )     No current facility-administered medications for this visit.      No Known Allergies    Past medical, surgical, social and family histories were reviewed and updated in EPIC.    ROS:   12 point review of systems negative other than symptoms noted below.    EXAM:  /60  Pulse 60  Ht 5' 4\" (1.626 m)  Wt 111 lb 6.4 oz (50.5 kg)  LMP 10/04/2018 (Exact Date)  BMI 19.12 kg/m2   BMI: Body mass index is 19.12 kg/(m^2).    PHYSICAL EXAM:  Constitutional:  Appearance: Well nourished, well developed, alert, in no acute distress  Neck:  Lymph Nodes:  No lymphadenopathy present    Thyroid:  Gland size normal, nontender, no nodules or masses present  on palpation  Chest:  Respiratory Effort:  Breathing unlabored  Cardiovascular:    Heart: Auscultation:  Regular rate, normal rhythm, no murmurs present  Breasts: Inspection of Breasts:  No lymphadenopathy present., Palpation of Breasts and Axillae:  No masses present on palpation, no breast tenderness., Axillary Lymph Nodes:  No lymphadenopathy present. and No nodularity, asymmetry or nipple discharge bilaterally.  Gastrointestinal:   Abdominal Examination:  Abdomen nontender to palpation, tone normal without rigidity or guarding, no masses present, umbilicus without lesions   Liver and Spleen:  No hepatomegaly present, liver nontender to palpation    Hernias:  No hernias present  Lymphatic: Lymph Nodes:  No other lymphadenopathy present  Skin:  General Inspection:  No rashes present, no lesions present, no areas of  discoloration    Genitalia and Groin:  No rashes present, no lesions present, no areas of  discoloration, no masses present  Neurologic/Psychiatric:    Mental Status:  Oriented X3     Pelvic Exam:  External Genitalia:     Normal appearance for age, no discharge present, no tenderness present, no inflammatory lesions present, color normal  Vagina:     Normal vaginal vault without central or " paravaginal defects, ATROPHIC  Bladder:     Nontender to palpation  Urethra:   Urethral Body:  Urethra palpation normal, urethra structural support normal   Urethral Meatus:  No erythema or lesions present  Cervix:     Appearance healthy, no lesions present, nontender to palpation, no bleeding present  Uterus:     Nontender to palpation, no masses present, position anteflexed, mobility: normal  Adnexa:     No adnexal tenderness present, no adnexal masses present  Perineum:     Perineum within normal limits, no evidence of trauma, no rashes or skin lesions present  Inguinal Lymph Nodes:     No lymphadenopathy present      COUNSELING:   Reviewed preventive health counseling, as reflected in patient instructions       Regular exercise       Healthy diet/nutrition    BMI: Body mass index is 19.12 kg/(m^2).      ASSESSMENT:  53 year old female with satisfactory annual exam.    ICD-10-CM    1. Encounter for gynecological examination without abnormal finding Z01.419 Pap imaged thin layer screen with HPV - recommended age 30 - 65     HPV High Risk Types DNA Cervical       PLAN: We will convey the patient's Pap and mammogram results when available.      Brandon Wellington MD    Injectable Influenza Immunization Documentation    1.  Is the person to be vaccinated sick today?   No    2. Does the person to be vaccinated have an allergy to a component   of the vaccine?   No  Egg Allergy Algorithm Link    3. Has the person to be vaccinated ever had a serious reaction   to influenza vaccine in the past?   No    4. Has the person to be vaccinated ever had Guillain-Barré syndrome?   No    Form completed by Kendall Weiss MA

## 2018-10-12 ASSESSMENT — ANXIETY QUESTIONNAIRES: GAD7 TOTAL SCORE: 1

## 2018-10-12 ASSESSMENT — PATIENT HEALTH QUESTIONNAIRE - PHQ9: SUM OF ALL RESPONSES TO PHQ QUESTIONS 1-9: 0

## 2018-10-15 LAB
COPATH REPORT: NORMAL
PAP: NORMAL

## 2018-10-17 LAB
FINAL DIAGNOSIS: NORMAL
HPV HR 12 DNA CVX QL NAA+PROBE: NEGATIVE
HPV16 DNA SPEC QL NAA+PROBE: NEGATIVE
HPV18 DNA SPEC QL NAA+PROBE: NEGATIVE
SPECIMEN DESCRIPTION: NORMAL
SPECIMEN SOURCE CVX/VAG CYTO: NORMAL

## 2018-11-29 ENCOUNTER — TRANSFERRED RECORDS (OUTPATIENT)
Dept: HEALTH INFORMATION MANAGEMENT | Facility: CLINIC | Age: 53
End: 2018-11-29

## 2019-08-27 DIAGNOSIS — F33.1 MODERATE EPISODE OF RECURRENT MAJOR DEPRESSIVE DISORDER (H): ICD-10-CM

## 2019-08-27 NOTE — TELEPHONE ENCOUNTER
"Requested Prescriptions   Pending Prescriptions Disp Refills     sertraline (ZOLOFT) 50 MG tablet 90 tablet 3     Sig: Take 0.5 tablets (25 mg) by mouth daily       SSRIs Protocol Failed - 8/27/2019  4:24 PM        Failed - PHQ-9 score less than 5 in past 6 months     Please review last PHQ-9 score.           Failed - Recent (6 mo) or future (30 days) visit within the authorizing provider's specialty     Patient had office visit in the last 6 months or has a visit in the next 30 days with authorizing provider or within the authorizing provider's specialty.  See \"Patient Info\" tab in inbasket, or \"Choose Columns\" in Meds & Orders section of the refill encounter.            Passed - Medication is active on med list        Passed - Patient is age 18 or older        Passed - No active pregnancy on record        Passed - No positive pregnancy test in last 12 months          Sertraline 50mg        Last written prescription date: 10/31/2018       Last fill quantity: 90, # refills: 3        Last office visit: 10/11/2018 Annual with Eliz        Future office visit: 10/16/2019 Annual        Is this a controlled substance?  No   Pt has refills available  Vi Gr RN on 8/27/2019 at 4:58 PM      "

## 2019-09-03 ENCOUNTER — TELEPHONE (OUTPATIENT)
Dept: OBGYN | Facility: CLINIC | Age: 54
End: 2019-09-03

## 2019-09-03 NOTE — TELEPHONE ENCOUNTER
Pt unable to refill rx-has a years worth of refills until 10/2019  Pharmacy contacted-will fill prescription  Vi Gr RN on 9/3/2019 at 10:09 AM

## 2019-10-10 NOTE — PROGRESS NOTES
Lauren is a 54 year old  female who presents for annual exam.     Besides routine health maintenance, she has no other health concerns today .    HPI: The patient is seen for her annual exam.  She is still having somewhat irregular cycles and a few hot flashes.  She complains of some tenderness of the left nipple.  She does wear a sports bra and works out a lot and feels that this may be due to abrasion.  She denies any nipple discharge.  The patient does not have a PCP         GYNECOLOGIC HISTORY:    Patient's last menstrual period was 2019.    Regular menses? no    Length of menses: 4 days    Her current contraception method is: none.  She  reports that she has never smoked. She has never used smokeless tobacco.    Patient is sexually active.  STD testing offered?  Declined  Last PHQ-9 score on record =   PHQ-9 SCORE 10/14/2019   PHQ-9 Total Score 0     Last GAD7 score on record =   ERNESTO-7 SCORE 10/14/2019   Total Score 0     Alcohol Score = 0    HEALTH MAINTENANCE:  Cholesterol:   Cholesterol   Date Value Ref Range Status   2014 197 115 - 199 mg/dL Final      Recent Labs   Lab Test 14   CHOL 197   HDL 85   LDL 98   TRIG 68   CHOLHDLRATIO 2.32     Last Mammo: One year ago, Result: Normal, Next Mammo: Today   Pap:   Lab Results   Component Value Date    PAP OTHER-NIL, See Result 10/11/2018    PAP NIL 10/02/2017    PAP OTHER-NIL, See Result 2016    10/11/18 WNL  HPV (-)neg, endo cells  Colonoscopy:  , Result: Normal, Next Colonoscopy: 1 years.  Dexa:  10/2/17    Health maintenance updated:  yes    HISTORY:  OB History    Para Term  AB Living   2 2 0 2 0 2   SAB TAB Ectopic Multiple Live Births   0 0 0 0 2      # Outcome Date GA Lbr Beni/2nd Weight Sex Delivery Anes PTL Lv   2   35w0d    Vag-Spont   NONI   1   35w0d    Vag-Spont   NONI       Patient Active Problem List   Diagnosis     NO ACTIVE PROBLEMS     Screening for cervical cancer     Past Surgical  "History:   Procedure Laterality Date     RECTOCELE REPAIR        Social History     Tobacco Use     Smoking status: Never Smoker     Smokeless tobacco: Never Used   Substance Use Topics     Alcohol use: No     Alcohol/week: 0.0 standard drinks      Problem (# of Occurrences) Relation (Name,Age of Onset)    Colon Cancer (1) Maternal Grandfather    Coronary Artery Disease (1) Father    Diabetes (1) Father    No Known Problems (6) Mother, Sister, Brother, Maternal Grandmother, Paternal Grandmother, Other            Current Outpatient Medications   Medication Sig     sertraline (ZOLOFT) 50 MG tablet Take 0.5 tablets (25 mg) by mouth daily     clobetasol (TEMOVATE) 0.05 % external solution APPLY TOPICALLY NIGHTLY TO SCALP     No current facility-administered medications for this visit.      No Known Allergies    Past medical, surgical, social and family histories were reviewed and updated in EPIC.    ROS:   12 point review of systems negative other than symptoms noted below.    EXAM:  /60   Ht 1.638 m (5' 4.5\")   Wt 49.9 kg (110 lb)   LMP 09/07/2019   Breastfeeding? No   BMI 18.59 kg/m     BMI: Body mass index is 18.59 kg/m .    PHYSICAL EXAM:  Constitutional:   Appearance: Well nourished, well developed, alert, in no acute distress  Neck:  Lymph Nodes:  No lymphadenopathy present    Thyroid:  Gland size normal, nontender, no nodules or masses present  on palpation  Chest:  Respiratory Effort:  Breathing unlabored  Cardiovascular:    Heart: Auscultation:  Regular rate, normal rhythm, no murmurs present  Breasts: Inspection of Breasts:  No lymphadenopathy present., Palpation of Breasts and Axillae:  No masses present on palpation, no breast tenderness., Axillary Lymph Nodes:  No lymphadenopathy present. and No nodularity, asymmetry or nipple discharge bilaterally.  Gastrointestinal:   Abdominal Examination:  Abdomen nontender to palpation, tone normal without rigidity or guarding, no masses present, umbilicus " without lesions   Liver and Spleen:  No hepatomegaly present, liver nontender to palpation    Hernias:  No hernias present  Lymphatic: Lymph Nodes:  No other lymphadenopathy present  Skin:  General Inspection:  No rashes present, no lesions present, no areas of  discoloration  Neurologic:    Mental Status:  Oriented X3.  Normal strength and tone, sensory exam                grossly normal, mentation intact and speech normal.    Psychiatric:   Mentation appears normal and affect normal/bright.         Pelvic Exam:  External Genitalia:     Normal appearance for age, no discharge present, no tenderness present, no inflammatory lesions present, color normal  Vagina:     Normal vaginal vault without central or paravaginal defects, no discharge present, no inflammatory lesions present, no masses present  Bladder:     Nontender to palpation  Urethra:   Urethral Body:  Urethra palpation normal, urethra structural support normal   Urethral Meatus:  No erythema or lesions present  Cervix:     Appearance healthy, no lesions present, nontender to palpation, no bleeding present  Uterus:     Uterus: firm, normal sized and nontender, midplane in position.   Adnexa:     No adnexal tenderness present, no adnexal masses present  Perineum:     Perineum within normal limits, no evidence of trauma, no rashes or skin lesions present  Anus:     Anus within normal limits, no hemorrhoids present  Inguinal Lymph Nodes:     No lymphadenopathy present  Pubic Hair:     Normal pubic hair distribution for age  Genitalia and Groin:     No rashes present, no lesions present, no areas of discoloration, no masses present      COUNSELING:   Reviewed preventive health counseling, as reflected in patient instructions       Regular exercise       Healthy diet/nutrition    BMI: Body mass index is 18.59 kg/m .      ASSESSMENT:  54 year old female with satisfactory annual exam.    ICD-10-CM    1. Encounter for gynecological examination without abnormal  finding Z01.419 Pap imaged thin layer screen with HPV - recommended age 30 - 65     HPV High Risk Types DNA Cervical   2. Moderate episode of recurrent major depressive disorder (H) F33.1        PLAN: Patient with good general exam.  She does complain of occasional urine leakage with running but has good support.  Kegel exercises were reinforced.  Her breast examination showed no masses or discharge.  There is some abrasion of the left nipple and this was discussed.  We will convey her Pap and mammogram results when available.  Menopause was discussed at length.      Brandon Wellington MD

## 2019-10-14 ENCOUNTER — OFFICE VISIT (OUTPATIENT)
Dept: OBGYN | Facility: CLINIC | Age: 54
End: 2019-10-14
Payer: COMMERCIAL

## 2019-10-14 ENCOUNTER — ANCILLARY PROCEDURE (OUTPATIENT)
Dept: MAMMOGRAPHY | Facility: CLINIC | Age: 54
End: 2019-10-14
Payer: COMMERCIAL

## 2019-10-14 VITALS
SYSTOLIC BLOOD PRESSURE: 108 MMHG | BODY MASS INDEX: 18.33 KG/M2 | DIASTOLIC BLOOD PRESSURE: 60 MMHG | WEIGHT: 110 LBS | HEIGHT: 65 IN

## 2019-10-14 DIAGNOSIS — Z12.31 VISIT FOR SCREENING MAMMOGRAM: ICD-10-CM

## 2019-10-14 DIAGNOSIS — Z01.419 ENCOUNTER FOR GYNECOLOGICAL EXAMINATION WITHOUT ABNORMAL FINDING: Primary | ICD-10-CM

## 2019-10-14 DIAGNOSIS — F33.1 MODERATE EPISODE OF RECURRENT MAJOR DEPRESSIVE DISORDER (H): ICD-10-CM

## 2019-10-14 PROCEDURE — 77067 SCR MAMMO BI INCL CAD: CPT | Mod: TC

## 2019-10-14 PROCEDURE — 77063 BREAST TOMOSYNTHESIS BI: CPT | Mod: TC

## 2019-10-14 PROCEDURE — G0145 SCR C/V CYTO,THINLAYER,RESCR: HCPCS | Performed by: OBSTETRICS & GYNECOLOGY

## 2019-10-14 PROCEDURE — 87624 HPV HI-RISK TYP POOLED RSLT: CPT | Performed by: OBSTETRICS & GYNECOLOGY

## 2019-10-14 PROCEDURE — 99396 PREV VISIT EST AGE 40-64: CPT | Performed by: OBSTETRICS & GYNECOLOGY

## 2019-10-14 RX ORDER — CLOBETASOL PROPIONATE 0.5 MG/ML
SOLUTION TOPICAL
Refills: 2 | COMMUNITY
Start: 2019-06-19 | End: 2021-12-02

## 2019-10-14 ASSESSMENT — ANXIETY QUESTIONNAIRES
6. BECOMING EASILY ANNOYED OR IRRITABLE: NOT AT ALL
GAD7 TOTAL SCORE: 0
5. BEING SO RESTLESS THAT IT IS HARD TO SIT STILL: NOT AT ALL
7. FEELING AFRAID AS IF SOMETHING AWFUL MIGHT HAPPEN: NOT AT ALL
2. NOT BEING ABLE TO STOP OR CONTROL WORRYING: NOT AT ALL
IF YOU CHECKED OFF ANY PROBLEMS ON THIS QUESTIONNAIRE, HOW DIFFICULT HAVE THESE PROBLEMS MADE IT FOR YOU TO DO YOUR WORK, TAKE CARE OF THINGS AT HOME, OR GET ALONG WITH OTHER PEOPLE: NOT DIFFICULT AT ALL
3. WORRYING TOO MUCH ABOUT DIFFERENT THINGS: NOT AT ALL
1. FEELING NERVOUS, ANXIOUS, OR ON EDGE: NOT AT ALL

## 2019-10-14 ASSESSMENT — MIFFLIN-ST. JEOR: SCORE: 1096.9

## 2019-10-14 ASSESSMENT — PATIENT HEALTH QUESTIONNAIRE - PHQ9
SUM OF ALL RESPONSES TO PHQ QUESTIONS 1-9: 0
5. POOR APPETITE OR OVEREATING: NOT AT ALL

## 2019-10-15 ASSESSMENT — ANXIETY QUESTIONNAIRES: GAD7 TOTAL SCORE: 0

## 2019-10-17 LAB
COPATH REPORT: NORMAL
PAP: NORMAL

## 2019-10-22 ENCOUNTER — TELEPHONE (OUTPATIENT)
Dept: OBGYN | Facility: CLINIC | Age: 54
End: 2019-10-22

## 2019-10-22 DIAGNOSIS — Z13.820 SPECIAL SCREENING FOR OSTEOPOROSIS: Primary | ICD-10-CM

## 2019-10-22 NOTE — TELEPHONE ENCOUNTER
"1: The last colonoscopy we have \"documented\" per her report was 2016 by Dr. Jones at [facility]. I would call their office for direction on screening as we don't have a report from 2016.  [phone]    2: Dexa can be done anytime. Insurance will pay for it every 2 years. I would recommend scheduling a DEXA and see Dr. Wellington after to review it in person.   "

## 2019-10-22 NOTE — TELEPHONE ENCOUNTER
Forgot to ask 3 questions at her last annual with Dr Wellington:  When does he recommend her next -  1-Colonoscopy-last 2016  2-Dexa - last one 10/2/2017    3-Shingles vaccine-discussed the recommendations to receive starting at age 50; discussed possible side effects from the non live virus vaccine. Pt will call to schedule vaccine.    Routing to provider to advise.    Cecille Canchloa RN on 10/22/2019 at 10:04 AM

## 2019-10-22 NOTE — TELEPHONE ENCOUNTER
Call placed to [facility] for [facility] guidance  They do not have record of her 2016 colonoscopy. Recommendations are usually every 5 years    Called pt and she confirmed her colonoscopy was at above location and in 2016. She will call their office to locate.    Recommendations for dexa given to pt - she will call back and schedule Dexa/OV with [place] and shingrx injection.    Pt verbalized understanding, in agreement with plan, and voiced no further questions.  Cecille Canchola RN on 10/22/2019 at 2:37 PM

## 2019-11-11 ENCOUNTER — TELEPHONE (OUTPATIENT)
Dept: OBGYN | Facility: CLINIC | Age: 54
End: 2019-11-11

## 2019-11-11 ENCOUNTER — ALLIED HEALTH/NURSE VISIT (OUTPATIENT)
Dept: LAB | Facility: CLINIC | Age: 54
End: 2019-11-11
Payer: COMMERCIAL

## 2019-11-11 ENCOUNTER — ANCILLARY PROCEDURE (OUTPATIENT)
Dept: BONE DENSITY | Facility: CLINIC | Age: 54
End: 2019-11-11
Payer: COMMERCIAL

## 2019-11-11 DIAGNOSIS — Z13.820 SPECIAL SCREENING FOR OSTEOPOROSIS: ICD-10-CM

## 2019-11-11 DIAGNOSIS — Z23 NEED FOR SHINGLES VACCINE: Primary | ICD-10-CM

## 2019-11-11 PROCEDURE — 90471 IMMUNIZATION ADMIN: CPT

## 2019-11-11 PROCEDURE — 77080 DXA BONE DENSITY AXIAL: CPT | Performed by: OBSTETRICS & GYNECOLOGY

## 2019-11-11 PROCEDURE — 90750 HZV VACC RECOMBINANT IM: CPT

## 2019-11-11 NOTE — PROGRESS NOTES
Prior to immunization administration, verified patients identity using patient s name and date of birth. Please see Immunization Activity for additional information.     Screening Questionnaire for Adult Immunization    Are you sick today?   No   Do you have allergies to medications, food, a vaccine component or latex?   No   Have you ever had a serious reaction after receiving a vaccination?   No   Do you have a long-term health problem with heart disease, lung disease, asthma, kidney disease, metabolic disease (e.g. diabetes), anemia, or other blood disorder?   No   Do you have cancer, leukemia, HIV/AIDS, or any other immune system problem?   No   In the past 3 months, have you taken medications that affect  your immune system, such as prednisone, other steroids, or anticancer drugs; drugs for the treatment of rheumatoid arthritis, Crohn s disease, or psoriasis; or have you had radiation treatments?   No   Have you had a seizure, or a brain or other nervous system problem?   No   During the past year, have you received a transfusion of blood or blood     products, or been given immune (gamma) globulin or antiviral drug?   No   For women: Are you pregnant or is there a chance you could become        pregnant during the next month?   No   Have you received any vaccinations in the past 4 weeks?   No     Immunization questionnaire answers were all negative.        Per orders of Dr. Wellington, injection of Shingrix given by Cayla Newby CMA. Patient instructed to remain in clinic for 15 minutes afterwards, and to report any adverse reaction to me immediately.       Screening performed by Cayla Newby CMA on 11/11/2019 at 8:26 AM.

## 2019-11-11 NOTE — TELEPHONE ENCOUNTER
Pt had a dexa scan today  Osteopenia diagnosed with last Dexa in 2017    Her insurance will not cover future scans if today's result notes in the Dexa indicate osteopenia.    Pt wanting to send above message to Dr Wellington.     Cecille Canchola RN on 11/11/2019 at 9:00 AM

## 2019-12-09 ENCOUNTER — HEALTH MAINTENANCE LETTER (OUTPATIENT)
Age: 54
End: 2019-12-09

## 2020-01-24 ENCOUNTER — ALLIED HEALTH/NURSE VISIT (OUTPATIENT)
Dept: NURSING | Facility: CLINIC | Age: 55
End: 2020-01-24
Payer: COMMERCIAL

## 2020-01-24 DIAGNOSIS — Z23 NEED FOR SHINGLES VACCINE: Primary | ICD-10-CM

## 2020-01-24 PROCEDURE — 99207 ZZC NO CHARGE NURSE ONLY: CPT

## 2020-01-24 PROCEDURE — 90471 IMMUNIZATION ADMIN: CPT

## 2020-01-24 PROCEDURE — 90750 HZV VACC RECOMBINANT IM: CPT

## 2020-01-24 NOTE — NURSING NOTE
Prior to immunization administration, verified patients identity using patient s name and date of birth. Please see Immunization Activity for additional information.     Screening Questionnaire for Adult Immunization    Are you sick today?   No   Do you have allergies to medications, food, a vaccine component or latex?   No   Have you ever had a serious reaction after receiving a vaccination?   No   Do you have a long-term health problem with heart, lung, kidney, or metabolic disease (e.g., diabetes), asthma, a blood disorder, no spleen, complement component deficiency, a cochlear implant, or a spinal fluid leak?  Are you on long-term aspirin therapy?   No   Do you have cancer, leukemia, HIV/AIDS, or any other immune system problem?   No   Do you have a parent, brother, or sister with an immune system problem?   No   In the past 3 months, have you taken medications that affect  your immune system, such as prednisone, other steroids, or anticancer drugs; drugs for the treatment of rheumatoid arthritis, Crohn s disease, or psoriasis; or have you had radiation treatments?   No   Have you had a seizure, or a brain or other nervous system problem?   No   During the past year, have you received a transfusion of blood or blood    products, or been given immune (gamma) globulin or antiviral drug?   No   For women: Are you pregnant or is there a chance you could become       pregnant during the next month?   No   Have you received any vaccinations in the past 4 weeks?   No     Immunization questionnaire answers were all negative.        Per orders of Dr. Torrez, injection of Shingrix given by Ailyn Watkins MA. Patient instructed to remain in clinic for 15 minutes afterwards, and to report any adverse reaction to me immediately.       Screening performed by Ailyn Watkins MA on 1/24/2020 at 9:38 AM.

## 2020-10-27 ENCOUNTER — ANCILLARY PROCEDURE (OUTPATIENT)
Dept: MAMMOGRAPHY | Facility: CLINIC | Age: 55
End: 2020-10-27
Payer: COMMERCIAL

## 2020-10-27 ENCOUNTER — OFFICE VISIT (OUTPATIENT)
Dept: OBGYN | Facility: CLINIC | Age: 55
End: 2020-10-27
Payer: COMMERCIAL

## 2020-10-27 VITALS
WEIGHT: 109.6 LBS | DIASTOLIC BLOOD PRESSURE: 52 MMHG | BODY MASS INDEX: 18.71 KG/M2 | HEART RATE: 62 BPM | HEIGHT: 64 IN | SYSTOLIC BLOOD PRESSURE: 102 MMHG

## 2020-10-27 DIAGNOSIS — Z01.419 ENCOUNTER FOR GYNECOLOGICAL EXAMINATION WITHOUT ABNORMAL FINDING: Primary | ICD-10-CM

## 2020-10-27 DIAGNOSIS — Z23 NEED FOR PROPHYLACTIC VACCINATION AND INOCULATION AGAINST INFLUENZA: ICD-10-CM

## 2020-10-27 DIAGNOSIS — Z23 NEED FOR TDAP VACCINATION: ICD-10-CM

## 2020-10-27 DIAGNOSIS — Z12.31 VISIT FOR SCREENING MAMMOGRAM: ICD-10-CM

## 2020-10-27 PROCEDURE — 90471 IMMUNIZATION ADMIN: CPT | Performed by: OBSTETRICS & GYNECOLOGY

## 2020-10-27 PROCEDURE — 99396 PREV VISIT EST AGE 40-64: CPT | Mod: 25 | Performed by: OBSTETRICS & GYNECOLOGY

## 2020-10-27 PROCEDURE — 90715 TDAP VACCINE 7 YRS/> IM: CPT | Performed by: OBSTETRICS & GYNECOLOGY

## 2020-10-27 PROCEDURE — G0145 SCR C/V CYTO,THINLAYER,RESCR: HCPCS | Performed by: OBSTETRICS & GYNECOLOGY

## 2020-10-27 PROCEDURE — 90682 RIV4 VACC RECOMBINANT DNA IM: CPT | Performed by: OBSTETRICS & GYNECOLOGY

## 2020-10-27 PROCEDURE — 77063 BREAST TOMOSYNTHESIS BI: CPT | Mod: TC | Performed by: RADIOLOGY

## 2020-10-27 PROCEDURE — 90472 IMMUNIZATION ADMIN EACH ADD: CPT | Performed by: OBSTETRICS & GYNECOLOGY

## 2020-10-27 PROCEDURE — 77067 SCR MAMMO BI INCL CAD: CPT | Mod: TC | Performed by: RADIOLOGY

## 2020-10-27 PROCEDURE — 87624 HPV HI-RISK TYP POOLED RSLT: CPT | Performed by: OBSTETRICS & GYNECOLOGY

## 2020-10-27 ASSESSMENT — ANXIETY QUESTIONNAIRES
GAD7 TOTAL SCORE: 3
6. BECOMING EASILY ANNOYED OR IRRITABLE: SEVERAL DAYS
7. FEELING AFRAID AS IF SOMETHING AWFUL MIGHT HAPPEN: NOT AT ALL
2. NOT BEING ABLE TO STOP OR CONTROL WORRYING: SEVERAL DAYS
3. WORRYING TOO MUCH ABOUT DIFFERENT THINGS: SEVERAL DAYS
5. BEING SO RESTLESS THAT IT IS HARD TO SIT STILL: NOT AT ALL
IF YOU CHECKED OFF ANY PROBLEMS ON THIS QUESTIONNAIRE, HOW DIFFICULT HAVE THESE PROBLEMS MADE IT FOR YOU TO DO YOUR WORK, TAKE CARE OF THINGS AT HOME, OR GET ALONG WITH OTHER PEOPLE: NOT DIFFICULT AT ALL
1. FEELING NERVOUS, ANXIOUS, OR ON EDGE: NOT AT ALL

## 2020-10-27 ASSESSMENT — PATIENT HEALTH QUESTIONNAIRE - PHQ9
SUM OF ALL RESPONSES TO PHQ QUESTIONS 1-9: 5
5. POOR APPETITE OR OVEREATING: NOT AT ALL

## 2020-10-27 ASSESSMENT — MIFFLIN-ST. JEOR: SCORE: 1078.17

## 2020-10-27 NOTE — PROGRESS NOTES
Lauren is a 55 year old  female who presents for annual exam.     Besides routine health maintenance, she has no other health concerns today .    HPI: Patient is seen at this time for her yearly exam.  She went 9 months without a period but then had a normal cycle.  She is asymptomatic at this time.  She had a bone density that showed osteopenia in the past.  The patient's PCP is [facility].      GYNECOLOGIC HISTORY:    No LMP recorded. Patient is perimenopausal.    Her current contraception method is: none.  She  reports that she has never smoked. She has never used smokeless tobacco.    Patient is not sexually active.  STD testing offered?  Declined  Last PHQ-9 score on record =   PHQ-9 SCORE 10/27/2020   PHQ-9 Total Score 5     Last GAD7 score on record =   ERNESTO-7 SCORE 10/27/2020   Total Score 3     Alcohol Score = 0    HEALTH MAINTENANCE:  Cholesterol:   Recent Labs   Lab Test 14   CHOL 197   HDL 85   LDL 98   TRIG 68   CHOLHDLRATIO 2.32     Last Mammo: One year ago, Result: Normal, Next Mammo: Today  Pap:   Lab Results   Component Value Date    PAP NIL, HPV- 10/14/2019    PAP OTHER-NIL, See Result 10/11/2018    PAP NIL 10/02/2017     Colonoscopy:  2018, Result: Normal, Next Colonoscopy: 2 year.  Dexa:  2019  FINDINGS:               Lumbar Spine (L1-L4)      T-score:  -1.3               Left Femoral Neck            T-score:  -1.6               Right Femoral Neck          T-score:  -1.7                Lumbar (L1-L4) BMD: 1.036 Previous: 1.067                          Total Hip Mean BMD: 0.847  Previous: 0.854    Health maintenance updated:  yes    HISTORY:  OB History    Para Term  AB Living   2 2 0 2 0 2   SAB TAB Ectopic Multiple Live Births   0 0 0 0 2      # Outcome Date GA Lbr Beni/2nd Weight Sex Delivery Anes PTL Lv   2   35w0d    Vag-Spont   NONI   1   35w0d    Vag-Spont   NONI       Patient Active Problem List   Diagnosis     NO ACTIVE  "PROBLEMS     Screening for cervical cancer     Past Surgical History:   Procedure Laterality Date     RECTOCELE REPAIR        Social History     Tobacco Use     Smoking status: Never Smoker     Smokeless tobacco: Never Used   Substance Use Topics     Alcohol use: No     Alcohol/week: 0.0 standard drinks      Problem (# of Occurrences) Relation (Name,Age of Onset)    Colon Cancer (1) Maternal Grandfather    Coronary Artery Disease (1) Father    Diabetes (1) Father    No Known Problems (5) Sister, Brother, Maternal Grandmother, Paternal Grandmother, Other    Parkinsonism (1) Mother            Current Outpatient Medications   Medication Sig     clobetasol (TEMOVATE) 0.05 % external solution APPLY TOPICALLY NIGHTLY TO SCALP     sertraline (ZOLOFT) 50 MG tablet Take 0.5 tablets (25 mg) by mouth daily (Patient not taking: Reported on 10/27/2020)     No current facility-administered medications for this visit.      No Known Allergies    Past medical, surgical, social and family histories were reviewed and updated in EPIC.    ROS:   12 point review of systems negative other than symptoms noted below or in the HPI.  No urinary frequency or dysuria, bladder or kidney problems    EXAM:  /52   Pulse 62   Ht 1.619 m (5' 3.75\")   Wt 49.7 kg (109 lb 9.6 oz)   Breastfeeding No   BMI 18.96 kg/m     BMI: Body mass index is 18.96 kg/m .    PHYSICAL EXAM:  Constitutional:   Appearance: Well nourished, well developed, alert, in no acute distress  Neck:  Lymph Nodes:  No lymphadenopathy present    Thyroid:  Gland size normal, nontender, no nodules or masses present  on palpation  Chest:  Respiratory Effort:  Breathing unlabored  Cardiovascular:    Heart: Auscultation:  Regular rate, normal rhythm, no murmurs present  Breasts: Inspection of Breasts:  No lymphadenopathy present., Palpation of Breasts and Axillae:  No masses present on palpation, no breast tenderness., Axillary Lymph Nodes:  No lymphadenopathy present. and No " nodularity, asymmetry or nipple discharge bilaterally.  Gastrointestinal:   Abdominal Examination:  Abdomen nontender to palpation, tone normal without rigidity or guarding, no masses present, umbilicus without lesions   Liver and Spleen:  No hepatomegaly present, liver nontender to palpation    Hernias:  No hernias present  Lymphatic: Lymph Nodes:  No other lymphadenopathy present  Skin:  General Inspection:  No rashes present, no lesions present, no areas of  discoloration  Neurologic:    Mental Status:  Oriented X3.  Normal strength and tone, sensory exam                grossly normal, mentation intact and speech normal.    Psychiatric:   Mentation appears normal and affect normal/bright.         Pelvic Exam:  External Genitalia:     Normal appearance for age, no discharge present, no tenderness present, no inflammatory lesions present, color normal  Vagina:     Normal vaginal vault without central or paravaginal defects, no discharge present, no inflammatory lesions present, no masses present  Bladder:     Nontender to palpation  Urethra:   Urethral Body:  Urethra palpation normal, urethra structural support normal   Urethral Meatus:  No erythema or lesions present  Cervix:     Appearance healthy, no lesions present, nontender to palpation, no bleeding present  Uterus:     Uterus: firm, normal sized and nontender, midplane in position.   Adnexa:     No adnexal tenderness present, no adnexal masses present  Perineum:     Perineum within normal limits, no evidence of trauma, no rashes or skin lesions present  Anus:     Anus within normal limits, no hemorrhoids present  Inguinal Lymph Nodes:     No lymphadenopathy present  Pubic Hair:     Normal pubic hair distribution for age  Genitalia and Groin:     No rashes present, no lesions present, no areas of discoloration, no masses present      COUNSELING:   Reviewed preventive health counseling, as reflected in patient instructions       Regular exercise       Healthy  diet/nutrition    BMI: Body mass index is 18.96 kg/m .      ASSESSMENT:  55 year old female with satisfactory annual exam.    ICD-10-CM    1. Encounter for gynecological examination without abnormal finding  Z01.419        PLAN: The patient will get her vaccinations today.  We will convey her Pap and mammogram results when available.  She will increase her calcium and vitamin D intake to try to strengthen her bones.      Brandon Wellington MD

## 2020-10-27 NOTE — NURSING NOTE
Prior to immunization administration, verified patients identity using patient s name and date of birth. Please see Immunization Activity for additional information.     Screening Questionnaire for Adult Immunization    Are you sick today?   No   Do you have allergies to medications, food, a vaccine component or latex?   No   Have you ever had a serious reaction after receiving a vaccination?   No   Do you have a long-term health problem with heart, lung, kidney, or metabolic disease (e.g., diabetes), asthma, a blood disorder, no spleen, complement component deficiency, a cochlear implant, or a spinal fluid leak?  Are you on long-term aspirin therapy?   No   Do you have cancer, leukemia, HIV/AIDS, or any other immune system problem?   No   Do you have a parent, brother, or sister with an immune system problem?   No   In the past 3 months, have you taken medications that affect  your immune system, such as prednisone, other steroids, or anticancer drugs; drugs for the treatment of rheumatoid arthritis, Crohn s disease, or psoriasis; or have you had radiation treatments?   No   Have you had a seizure, or a brain or other nervous system problem?   No   During the past year, have you received a transfusion of blood or blood    products, or been given immune (gamma) globulin or antiviral drug?   No   For women: Are you pregnant or is there a chance you could become       pregnant during the next month?   No   Have you received any vaccinations in the past 4 weeks?   No     Immunization questionnaire answers were all negative.        Per orders of Dr. Wellington, injection of Tdap given by Maria Del Carmen Mendez MA. Patient instructed to remain in clinic for 15 minutes afterwards, and to report any adverse reaction to me immediately.       Screening performed by Maria Del Carmen Mendez MA on 10/27/2020 at 1:41 PM.

## 2020-10-28 ASSESSMENT — ANXIETY QUESTIONNAIRES: GAD7 TOTAL SCORE: 3

## 2020-10-29 LAB
COPATH REPORT: NORMAL
PAP: NORMAL

## 2021-01-14 ENCOUNTER — OFFICE VISIT (OUTPATIENT)
Dept: OBGYN | Facility: CLINIC | Age: 56
End: 2021-01-14
Payer: COMMERCIAL

## 2021-01-14 VITALS
BODY MASS INDEX: 18.1 KG/M2 | WEIGHT: 106 LBS | TEMPERATURE: 98.3 F | HEIGHT: 64 IN | SYSTOLIC BLOOD PRESSURE: 108 MMHG | DIASTOLIC BLOOD PRESSURE: 72 MMHG

## 2021-01-14 DIAGNOSIS — R30.0 DYSURIA: Primary | ICD-10-CM

## 2021-01-14 LAB
ALBUMIN UR-MCNC: NEGATIVE MG/DL
APPEARANCE UR: ABNORMAL
BILIRUB UR QL STRIP: NEGATIVE
COLOR UR AUTO: YELLOW
GLUCOSE UR STRIP-MCNC: NEGATIVE MG/DL
HGB UR QL STRIP: ABNORMAL
KETONES UR STRIP-MCNC: NEGATIVE MG/DL
LEUKOCYTE ESTERASE UR QL STRIP: ABNORMAL
NITRATE UR QL: NEGATIVE
PH UR STRIP: 7.5 PH (ref 5–7)
SOURCE: ABNORMAL
SP GR UR STRIP: 1.02 (ref 1–1.03)
UROBILINOGEN UR STRIP-ACNC: 0.2 EU/DL (ref 0.2–1)

## 2021-01-14 PROCEDURE — 99213 OFFICE O/P EST LOW 20 MIN: CPT | Performed by: OBSTETRICS & GYNECOLOGY

## 2021-01-14 PROCEDURE — 81003 URINALYSIS AUTO W/O SCOPE: CPT | Performed by: OBSTETRICS & GYNECOLOGY

## 2021-01-14 RX ORDER — NITROFURANTOIN 25; 75 MG/1; MG/1
100 CAPSULE ORAL 2 TIMES DAILY
Qty: 14 CAPSULE | Refills: 0 | Status: SHIPPED | OUTPATIENT
Start: 2021-01-14 | End: 2021-06-10

## 2021-01-14 RX ORDER — CEPHALEXIN 500 MG/1
CAPSULE ORAL
COMMUNITY
Start: 2021-01-10 | End: 2021-02-03

## 2021-01-14 ASSESSMENT — MIFFLIN-ST. JEOR: SCORE: 1061.84

## 2021-01-14 NOTE — PROGRESS NOTES
SUBJECTIVE:                                                   Lauren Stanley is a 55 year old female who presents to clinic today for the following health issue(s):  Patient presents with:  UTI: was treated for UTI no relief in symptoms      HPI: The patient is seen at this time for a recent onset of almost constant sexual arousal and some urgency and frequency without dysuria.  She was seen in an urgent care over the weekend and had trace amount of blood and leukocyte esterase and was treated with cephalexin 500 mg twice a day for 7 days.  She has no better at this time.  Urinalysis is repeated today.  Of note she has recently undertaken a strenuous exercise program on a new Peloton and there may be some relationship to the stimulation that she is feeling.      Patient's last menstrual period was 2020 (approximate)..     Patient is not sexually active, .  Using not sexually active for contraception.    reports that she has never smoked. She has never used smokeless tobacco.    Health maintenance updated:  yes    Today's PHQ-2 Score:   PHQ-2 (  [employer]) 2021   Q1: Little interest or pleasure in doing things 0   Q2: Feeling down, depressed or hopeless 0   PHQ-2 Score 0   Q1: Little interest or pleasure in doing things -   Q2: Feeling down, depressed or hopeless -   PHQ-2 Score -     Today's PHQ-9 Score:   PHQ-9 SCORE 10/27/2020   PHQ-9 Total Score 5     Today's ERNESTO-7 Score:   ERNESTO-7 SCORE 10/27/2020   Total Score 3       Problem list and histories reviewed & adjusted, as indicated.  Additional history: as documented.    Patient Active Problem List   Diagnosis     NO ACTIVE PROBLEMS     Screening for cervical cancer     Past Surgical History:   Procedure Laterality Date     RECTOCELE REPAIR        Social History     Tobacco Use     Smoking status: Never Smoker     Smokeless tobacco: Never Used   Substance Use Topics     Alcohol use: No     Alcohol/week: 0.0 standard drinks      Problem (# of  "Occurrences) Relation (Name,Age of Onset)    Colon Cancer (1) Maternal Grandfather    Coronary Artery Disease (1) Father    Diabetes (1) Father    No Known Problems (5) Sister, Brother, Maternal Grandmother, Paternal Grandmother, Other    Parkinsonism (1) Mother            Current Outpatient Medications   Medication Sig     clobetasol (TEMOVATE) 0.05 % external solution APPLY TOPICALLY NIGHTLY TO SCALP     cephALEXin (KEFLEX) 500 MG capsule      No current facility-administered medications for this visit.      No Known Allergies    ROS:  12 point review of systems negative other than symptoms noted below or in the HPI.  Genitourinary: Painful Urination  No urinary frequency or dysuria, bladder or kidney problems      OBJECTIVE:     /72   Temp 98.3  F (36.8  C)   Ht 1.619 m (5' 3.75\")   Wt 48.1 kg (106 lb)   LMP 02/01/2020 (Approximate)   BMI 18.34 kg/m    Body mass index is 18.34 kg/m .    Exam:  Constitutional:  Appearance: Well nourished, well developed alert, in no acute distress  Lymphatic: Lymph Nodes:  No other lymphadenopathy present  Skin: General Inspection:  No rashes present, no lesions present, no areas of discoloration.  Neurologic:  Mental Status:  Oriented X3.  Normal strength and tone, sensory exam grossly normal, mentation intact and speech normal.    Psychiatric:  Mentation appears normal and affect normal/bright.  Pelvic Exam:  External Genitalia: Plaque-like psoriasis across posterior fourchette left greater than right   Normal appearance for age, no discharge present, no tenderness present, no inflammatory lesions present, color normal normal clitoral joaquin with no sign of psoriasis or other inflammation.  Vagina:     Normal vaginal vault without central or paravaginal defects, no discharge present, no inflammatory lesions present, no masses present  Bladder:     Nontender to palpation  Urethra:   Urethral Body:  Urethra palpation normal, urethra structural support normal   Urethral " Meatus:  No erythema or lesions present  Cervix:     Appearance healthy, no lesions present, nontender to palpation, no bleeding present  Uterus:     Uterus: firm, normal sized and nontender, anteverted in position.   Adnexa:     No adnexal tenderness present, no adnexal masses present  Perineum:     Perineum within normal limits, no evidence of trauma, no rashes or skin lesions present  Anus:     Anus within normal limits, no hemorrhoids present  Inguinal Lymph Nodes:     No lymphadenopathy present  Pubic Hair:     Normal pubic hair distribution for age  Genitalia and Groin:     No rashes present, no lesions present, no areas of discoloration, no masses present       In-Clinic Test Results:  Results for orders placed or performed in visit on 01/14/21 (from the past 24 hour(s))   UA without Microscopic   Result Value Ref Range    Color Urine Yellow     Appearance Urine Slightly Cloudy     Glucose Urine Negative NEG^Negative mg/dL    Bilirubin Urine Negative NEG^Negative    Ketones Urine Negative NEG^Negative mg/dL    Specific Gravity Urine 1.020 1.003 - 1.035    Blood Urine Trace (A) NEG^Negative    pH Urine 7.5 (H) 5.0 - 7.0 pH    Protein Albumin Urine Negative NEG^Negative mg/dL    Urobilinogen Urine 0.2 0.2 - 1.0 EU/dL    Nitrite Urine Negative NEG^Negative    Leukocyte Esterase Urine Trace (A) NEG^Negative    Source Midstream Urine        ASSESSMENT/PLAN:                                                        ICD-10-CM    1. Dysuria  R30.0 UA without Microscopic       Patient with 2 separate issues.  I believe that her constant stimulation is due to her exercise program on her new Peloton.  Her trace positivity on urinalysis today will be followed up by culture.  We will switch her over to Macrobid and encourage her to have a sitz bath once or twice a day to decrease overall perineal inflammation.        Brandon Wellington MD  [facility] [place]

## 2021-02-02 ENCOUNTER — TELEPHONE (OUTPATIENT)
Dept: OBGYN | Facility: CLINIC | Age: 56
End: 2021-02-02

## 2021-02-02 NOTE — TELEPHONE ENCOUNTER
Routing to DONAVON David NP    Did you see a previous telephone encounter from today?      Vi Gr RN    2/2/21 8:12 AM  Note     OV with Dr. Wellington 1/14/21  Symptoms really haven't improved. Was switched from Keflex to Macrobid-doing her tub soaks.  Lower pelvic pain/pelvic pressure as though she always needs to urinate.   Continued feeling of vaginal stimulation/sensitivity and burning.   Wondering about next steps, can leave a urine sample this morning if needed.   Vi Gr RN on 2/2/2021 at 8:10 AM                Cecille Canchola RN on 2/2/2021 at 1:52 PM

## 2021-02-02 NOTE — PROGRESS NOTES
SUBJECTIVE:                                                   Lauren Stanley is a 55 year old female who presents to clinic today for the following health issue(s):  Patient presents with:  Vaginal Problem: Pt is still having the same symptoms that she was having last visit. Pt is having some more burning that is keeping her awake now and is having some urgency, but little comes out.        HPI: The patient is seen at this time with some persistent lower abdominal and pelvic discomfort.  She was treated for a urinary tract infection with 2 different antibiotics.  She still has some pressure that wakes her at night.  She has not had any abnormal vaginal discharge.  She is perimenopausal.      No LMP recorded. Patient is perimenopausal..     Patient is sexually active, .  Using not sexually active for contraception.    reports that she has never smoked. She has never used smokeless tobacco.      Health maintenance updated:  yes    Today's PHQ-2 Score:   PHQ-2 (  [employer]) 2021   Q1: Little interest or pleasure in doing things 0   Q2: Feeling down, depressed or hopeless 0   PHQ-2 Score 0   Q1: Little interest or pleasure in doing things -   Q2: Feeling down, depressed or hopeless -   PHQ-2 Score -     Today's PHQ-9 Score:   PHQ-9 SCORE 10/27/2020   PHQ-9 Total Score 5     Today's ERNESTO-7 Score:   ERNESTO-7 SCORE 10/27/2020   Total Score 3       Problem list and histories reviewed & adjusted, as indicated.  Additional history: as documented.    Patient Active Problem List   Diagnosis     NO ACTIVE PROBLEMS     Screening for cervical cancer     Past Surgical History:   Procedure Laterality Date     RECTOCELE REPAIR        Social History     Tobacco Use     Smoking status: Never Smoker     Smokeless tobacco: Never Used   Substance Use Topics     Alcohol use: No     Alcohol/week: 0.0 standard drinks      Problem (# of Occurrences) Relation (Name,Age of Onset)    Colon Cancer (1) Maternal Grandfather    Coronary  "Artery Disease (1) Father    Diabetes (1) Father    No Known Problems (5) Sister, Brother, Maternal Grandmother, Paternal Grandmother, Other    Parkinsonism (1) Mother            Current Outpatient Medications   Medication Sig     clobetasol (TEMOVATE) 0.05 % external solution APPLY TOPICALLY NIGHTLY TO SCALP     nitroFURantoin macrocrystal-monohydrate (MACROBID) 100 MG capsule Take 1 capsule (100 mg) by mouth 2 times daily (Patient not taking: Reported on 2/3/2021)     No current facility-administered medications for this visit.      No Known Allergies    ROS:  12 point review of systems negative other than symptoms noted below or in the HPI.  Genitourinary: Pelvic Pain and vaginal burning.  dysuria      OBJECTIVE:     /64   Ht 1.619 m (5' 3.75\")   Breastfeeding No   BMI 18.34 kg/m    Body mass index is 18.34 kg/m .    Exam:  Constitutional:  Appearance: Well nourished, well developed alert, in no acute distress  Gastrointestinal:  Abdominal Examination:  Abdomen nontender to palpation, tone normal without rigidity or guarding, no masses present, umbilicus without lesions; Liver/Spleen:  No hepatomegaly present, liver nontender to palpation; Hernias:  No hernias present  Lymphatic: Lymph Nodes:  No other lymphadenopathy present  Skin: General Inspection:  No rashes present, no lesions present, no areas of discoloration.  Neurologic:  Mental Status:  Oriented X3.  Normal strength and tone, sensory exam grossly normal, mentation intact and speech normal.    Psychiatric:  Mentation appears normal and affect normal/bright.  Pelvic Exam:  External Genitalia:     Normal appearance for age, no discharge present, no tenderness present, no inflammatory lesions present, color normal  Vagina:     Normal vaginal vault without central or paravaginal defects, ATROPHIC  Bladder:     Nontender to palpation  Urethra:   Urethral Body:  Urethra palpation normal, urethra structural support normal   Urethral Meatus:  No " erythema or lesions present  Cervix:     Appearance healthy, no lesions present, nontender to palpation, no bleeding present  Uterus:     Nontender to palpation, no masses present, position anteflexed, mobility: normal  Adnexa:     No adnexal tenderness present, no adnexal masses present  Perineum:     Perineum within normal limits, no evidence of trauma, no rashes or skin lesions present  Inguinal Lymph Nodes:     No lymphadenopathy present       In-Clinic Test Results:  Results for orders placed or performed in visit on 02/03/21 (from the past 24 hour(s))   UA without Microscopic   Result Value Ref Range    Color Urine Yellow     Appearance Urine Clear     Glucose Urine Negative NEG^Negative mg/dL    Bilirubin Urine Negative NEG^Negative    Ketones Urine Negative NEG^Negative mg/dL    Specific Gravity Urine 1.025 1.003 - 1.035    Blood Urine Trace (A) NEG^Negative    pH Urine 7.0 5.0 - 7.0 pH    Protein Albumin Urine Negative NEG^Negative mg/dL    Urobilinogen Urine 0.2 0.2 - 1.0 EU/dL    Nitrite Urine Negative NEG^Negative    Leukocyte Esterase Urine Trace (A) NEG^Negative    Source Midstream Urine        ASSESSMENT/PLAN:                                                        ICD-10-CM    1. Dysuria  R30.0 UA without Microscopic       Patient with pelvic pressure and pain but a negative examination of the vagina cervix uterus and adnexa.  Where she points her pain is above the bladder.  Her urinalysis is positive for leukocyte Estrace and trace of blood.  Vaginal culture is pending at this time but I do not believe that this is vaginal infection.  We will empirically treat this with Bactrim DS at this time and check both a urine culture and the vaginal culture.        Brandon Wellington MD  [facility] [place]

## 2021-02-02 NOTE — TELEPHONE ENCOUNTER
Discussed recommendations with pt. Agrees to make an appointment to be seen in clinic  Vi Gr RN on 2/2/2021 at 2:09 PM

## 2021-02-02 NOTE — TELEPHONE ENCOUNTER
At the time of her visit we did not send a UC. I don't know why we didn't. Nonetheless, if she isn't getting better I would suggest an office visit  With UA/UC and possibly vaginal cultures if shes not improving.

## 2021-02-02 NOTE — TELEPHONE ENCOUNTER
OV with Dr. Wellington 1/14/21  Symptoms really haven't improved. Was switched from Keflex to Macrobid-doing her tub soaks.  Lower pelvic pain/pelvic pressure as though she always needs to urinate.   Continued feeling of vaginal stimulation/sensitivity and burning.   Wondering about next steps, can leave a urine sample this morning if needed.   Vi Gr RN on 2/2/2021 at 8:10 AM

## 2021-02-03 ENCOUNTER — OFFICE VISIT (OUTPATIENT)
Dept: OBGYN | Facility: CLINIC | Age: 56
End: 2021-02-03
Payer: COMMERCIAL

## 2021-02-03 VITALS — HEIGHT: 64 IN | SYSTOLIC BLOOD PRESSURE: 118 MMHG | DIASTOLIC BLOOD PRESSURE: 64 MMHG | BODY MASS INDEX: 18.34 KG/M2

## 2021-02-03 DIAGNOSIS — N94.89 VAGINAL BURNING: ICD-10-CM

## 2021-02-03 DIAGNOSIS — R30.0 DYSURIA: Primary | ICD-10-CM

## 2021-02-03 DIAGNOSIS — R39.9 UTI SYMPTOMS: Primary | ICD-10-CM

## 2021-02-03 LAB
ALBUMIN UR-MCNC: NEGATIVE MG/DL
APPEARANCE UR: CLEAR
BILIRUB UR QL STRIP: NEGATIVE
COLOR UR AUTO: YELLOW
GLUCOSE UR STRIP-MCNC: NEGATIVE MG/DL
HGB UR QL STRIP: ABNORMAL
KETONES UR STRIP-MCNC: NEGATIVE MG/DL
LEUKOCYTE ESTERASE UR QL STRIP: ABNORMAL
NITRATE UR QL: NEGATIVE
PH UR STRIP: 7 PH (ref 5–7)
SOURCE: ABNORMAL
SP GR UR STRIP: 1.02 (ref 1–1.03)
UROBILINOGEN UR STRIP-ACNC: 0.2 EU/DL (ref 0.2–1)

## 2021-02-03 PROCEDURE — 87102 FUNGUS ISOLATION CULTURE: CPT | Performed by: OBSTETRICS & GYNECOLOGY

## 2021-02-03 PROCEDURE — 87653 STREP B DNA AMP PROBE: CPT | Performed by: OBSTETRICS & GYNECOLOGY

## 2021-02-03 PROCEDURE — 87086 URINE CULTURE/COLONY COUNT: CPT | Performed by: NURSE PRACTITIONER

## 2021-02-03 PROCEDURE — 81003 URINALYSIS AUTO W/O SCOPE: CPT | Performed by: OBSTETRICS & GYNECOLOGY

## 2021-02-03 PROCEDURE — 99213 OFFICE O/P EST LOW 20 MIN: CPT | Performed by: OBSTETRICS & GYNECOLOGY

## 2021-02-03 RX ORDER — SULFAMETHOXAZOLE/TRIMETHOPRIM 800-160 MG
1 TABLET ORAL 2 TIMES DAILY
Qty: 14 TABLET | Refills: 1 | Status: SHIPPED | OUTPATIENT
Start: 2021-02-03 | End: 2021-02-10

## 2021-02-04 LAB
BACTERIA SPEC CULT: NORMAL
GP B STREP DNA SPEC QL NAA+PROBE: NEGATIVE
Lab: NORMAL
SPECIMEN SOURCE: NORMAL
SPECIMEN SOURCE: NORMAL

## 2021-02-08 ENCOUNTER — TELEPHONE (OUTPATIENT)
Dept: OBGYN | Facility: CLINIC | Age: 56
End: 2021-02-08

## 2021-02-08 LAB
Lab: NORMAL
SPECIMEN SOURCE: NORMAL
YEAST SPEC QL CULT: NORMAL

## 2021-02-08 NOTE — TELEPHONE ENCOUNTER
Pt calling for results from last weeks visit.  Sx's are improving.  She did not see mychart message from DONAVON David NP, read them over the phone. UCx is negative. She will continue abx.  Pt verbalized understanding, in agreement with plan, and voiced no further questions.      Cecille Canchola RN on 2/8/2021 at 8:29 AM

## 2021-06-10 ENCOUNTER — OFFICE VISIT (OUTPATIENT)
Dept: OBGYN | Facility: CLINIC | Age: 56
End: 2021-06-10
Payer: COMMERCIAL

## 2021-06-10 VITALS
WEIGHT: 107 LBS | HEART RATE: 72 BPM | SYSTOLIC BLOOD PRESSURE: 112 MMHG | BODY MASS INDEX: 18.27 KG/M2 | HEIGHT: 64 IN | DIASTOLIC BLOOD PRESSURE: 76 MMHG

## 2021-06-10 DIAGNOSIS — N95.1 SYMPTOMS, SUCH AS FLUSHING, SLEEPLESSNESS, HEADACHE, LACK OF CONCENTRATION, ASSOCIATED WITH THE MENOPAUSE: Primary | ICD-10-CM

## 2021-06-10 PROCEDURE — 99212 OFFICE O/P EST SF 10 MIN: CPT | Performed by: OBSTETRICS & GYNECOLOGY

## 2021-06-10 ASSESSMENT — MIFFLIN-ST. JEOR: SCORE: 1061.38

## 2021-06-10 ASSESSMENT — PATIENT HEALTH QUESTIONNAIRE - PHQ9: SUM OF ALL RESPONSES TO PHQ QUESTIONS 1-9: 6

## 2021-06-10 NOTE — PROGRESS NOTES
SUBJECTIVE:                                                   Lauren Stanley is a 56 year old female who presents to clinic today for the following health issue(s):  Patient presents with:  Other: has a lump in groin area      HPI: The patient is seen at this time for 2 main problems.  She has palpated a new nodule in her left groin.  This is nontender.  She is very sensitized as she just lost a close friend to a lymphoma who presented with a groin node.  She has no recent infections.  She has menopausal.  Her second problem is insomnia related to night sweats.  We have also discussed vaginal atrophy in the past and will readdress the issue of replacement therapy.      No LMP recorded. Patient is perimenopausal..     Patient is not sexually active, .  Using menopause for contraception.    reports that she has never smoked. She has never used smokeless tobacco.    STD testing offered?  Declined    Health maintenance updated:  yes    Today's PHQ-2 Score:   PHQ-2 (  [employer]) 6/10/2021   Q1: Little interest or pleasure in doing things 0   Q2: Feeling down, depressed or hopeless 3   PHQ-2 Score 3   Q1: Little interest or pleasure in doing things -   Q2: Feeling down, depressed or hopeless -   PHQ-2 Score -     Today's PHQ-9 Score:   PHQ-9 SCORE 6/10/2021   PHQ-9 Total Score 6     Today's ERNESTO-7 Score:   ERNESTO-7 SCORE 10/27/2020   Total Score 3       Problem list and histories reviewed & adjusted, as indicated.  Additional history: as documented.    Patient Active Problem List   Diagnosis     NO ACTIVE PROBLEMS     Screening for cervical cancer     Past Surgical History:   Procedure Laterality Date     RECTOCELE REPAIR        Social History     Tobacco Use     Smoking status: Never Smoker     Smokeless tobacco: Never Used   Substance Use Topics     Alcohol use: No     Alcohol/week: 0.0 standard drinks      Problem (# of Occurrences) Relation (Name,Age of Onset)    Colon Cancer (1) Maternal Grandfather    Coronary  "Artery Disease (1) Father    Diabetes (1) Father    No Known Problems (5) Sister, Brother, Maternal Grandmother, Paternal Grandmother, Other    Parkinsonism (1) Mother            Current Outpatient Medications   Medication Sig     clobetasol (TEMOVATE) 0.05 % external solution APPLY TOPICALLY NIGHTLY TO SCALP     No current facility-administered medications for this visit.      No Known Allergies    ROS:  12 point review of systems negative other than symptoms noted below or in the HPI.  No urinary frequency or dysuria, bladder or kidney problems      OBJECTIVE:     /76   Pulse 72   Ht 1.619 m (5' 3.75\")   Wt 48.5 kg (107 lb)   BMI 18.51 kg/m    Body mass index is 18.51 kg/m .    Exam:  Constitutional:  Appearance: Well nourished, well developed alert, in no acute distress  Gastrointestinal:  Abdominal Examination:  Abdomen nontender to palpation, tone normal without rigidity or guarding, no masses present, umbilicus without lesions; bilateral palpable and highly mobile rubbery lymph nodes.  The largest is approximately 1 cm x 6 mm on the left.  The patient has so little subcutaneous fat that this is easily palpated.  Liver/Spleen:  No hepatomegaly present, liver nontender to palpation; Hernias:  No hernias present  Lymphatic: Lymph Nodes:  No other lymphadenopathy present  Skin: General Inspection:  No rashes present, no lesions present, no areas of discoloration.  Neurologic:  Mental Status:  Oriented X3.  Normal strength and tone, sensory exam grossly normal, mentation intact and speech normal.    Psychiatric:  Mentation appears normal and affect normal/bright.  Pelvic Exam:  External Genitalia:     Normal appearance for age, no discharge present, no tenderness present, no inflammatory lesions present, color normal  Vagina:     Normal vaginal vault without central or paravaginal defects, no discharge present, no inflammatory lesions present, no masses present  Bladder:     Nontender to " palpation  Urethra:   Urethral Body:  Urethra palpation normal, urethra structural support normal   Urethral Meatus:  No erythema or lesions present  Cervix:     Appearance healthy, no lesions present, nontender to palpation, no bleeding present  Uterus:     Uterus: firm, normal sized and nontender, midplane in position.   Adnexa:     No adnexal tenderness present, no adnexal masses present  Perineum:     Perineum within normal limits, no evidence of trauma, no rashes or skin lesions present  Anus:     Anus within normal limits, no hemorrhoids present  Inguinal Lymph Nodes:     No lymphadenopathy present  Pubic Hair:     Normal pubic hair distribution for age  Genitalia and Groin:     No rashes present, no lesions present, no areas of discoloration, no masses present       In-Clinic Test Results:      ASSESSMENT/PLAN:                                                        ICD-10-CM    1. Symptoms, such as flushing, sleeplessness, headache, lack of concentration, associated with the menopause  N95.1        56-year-old menopausal female who now has some insomnia but no cognitive deficits.  We spent time going over the fact that she has very little of her own endogenous estrogen being made as she has very low body fat.  We will defer any further discussion of estrogen replacement therapy at this time but see her back in the fall.  The patient has multiple soft lymph nodes in both groins.  She is so thin that it is easy to palpate the entire chain on both sides.  We will monitor the largest node on the left with a return visit in 2 months.        Brandon Wellington MD  [facility]

## 2021-10-24 ENCOUNTER — HEALTH MAINTENANCE LETTER (OUTPATIENT)
Age: 56
End: 2021-10-24

## 2021-11-18 ENCOUNTER — ANCILLARY PROCEDURE (OUTPATIENT)
Dept: MAMMOGRAPHY | Facility: CLINIC | Age: 56
End: 2021-11-18
Payer: COMMERCIAL

## 2021-11-18 DIAGNOSIS — Z12.31 VISIT FOR SCREENING MAMMOGRAM: ICD-10-CM

## 2021-11-18 PROCEDURE — 77067 SCR MAMMO BI INCL CAD: CPT | Mod: TC | Performed by: RADIOLOGY

## 2021-11-18 PROCEDURE — 77063 BREAST TOMOSYNTHESIS BI: CPT | Mod: TC | Performed by: RADIOLOGY

## 2021-12-02 ENCOUNTER — OFFICE VISIT (OUTPATIENT)
Dept: OBGYN | Facility: CLINIC | Age: 56
End: 2021-12-02
Payer: COMMERCIAL

## 2021-12-02 VITALS
SYSTOLIC BLOOD PRESSURE: 98 MMHG | BODY MASS INDEX: 18.13 KG/M2 | DIASTOLIC BLOOD PRESSURE: 50 MMHG | WEIGHT: 106.2 LBS | HEART RATE: 50 BPM | HEIGHT: 64 IN

## 2021-12-02 DIAGNOSIS — Z01.419 ENCOUNTER FOR GYNECOLOGICAL EXAMINATION WITHOUT ABNORMAL FINDING: Primary | ICD-10-CM

## 2021-12-02 PROCEDURE — 99396 PREV VISIT EST AGE 40-64: CPT | Performed by: OBSTETRICS & GYNECOLOGY

## 2021-12-02 PROCEDURE — 87624 HPV HI-RISK TYP POOLED RSLT: CPT | Performed by: OBSTETRICS & GYNECOLOGY

## 2021-12-02 PROCEDURE — G0145 SCR C/V CYTO,THINLAYER,RESCR: HCPCS | Performed by: OBSTETRICS & GYNECOLOGY

## 2021-12-02 RX ORDER — TRIAMCINOLONE ACETONIDE 1 MG/G
CREAM TOPICAL
COMMUNITY
Start: 2021-08-10 | End: 2021-12-02

## 2021-12-02 ASSESSMENT — ANXIETY QUESTIONNAIRES
GAD7 TOTAL SCORE: 5
3. WORRYING TOO MUCH ABOUT DIFFERENT THINGS: NOT AT ALL
IF YOU CHECKED OFF ANY PROBLEMS ON THIS QUESTIONNAIRE, HOW DIFFICULT HAVE THESE PROBLEMS MADE IT FOR YOU TO DO YOUR WORK, TAKE CARE OF THINGS AT HOME, OR GET ALONG WITH OTHER PEOPLE: NOT DIFFICULT AT ALL
5. BEING SO RESTLESS THAT IT IS HARD TO SIT STILL: NOT AT ALL
6. BECOMING EASILY ANNOYED OR IRRITABLE: SEVERAL DAYS
1. FEELING NERVOUS, ANXIOUS, OR ON EDGE: SEVERAL DAYS
2. NOT BEING ABLE TO STOP OR CONTROL WORRYING: NOT AT ALL
7. FEELING AFRAID AS IF SOMETHING AWFUL MIGHT HAPPEN: NOT AT ALL

## 2021-12-02 ASSESSMENT — PATIENT HEALTH QUESTIONNAIRE - PHQ9: 5. POOR APPETITE OR OVEREATING: NEARLY EVERY DAY

## 2021-12-02 ASSESSMENT — MIFFLIN-ST. JEOR: SCORE: 1057.75

## 2021-12-02 NOTE — PROGRESS NOTES
Lauren is a 56 year old  female who presents for annual exam.     Besides routine health maintenance, she has no other health concerns today .    HPI: The patient is seen at this time for her annual exam.  She is postmenopausal and on no replacement therapy.  The patient's PCP is [facility].        GYNECOLOGIC HISTORY:    Patient's last menstrual period was 2020 (approximate).    Her current contraception method is: menopause.  She  reports that she has never smoked. She has never used smokeless tobacco.    Patient is not sexually active.  STD testing offered?  Declined  Last PHQ-9 score on record =   PHQ-9 SCORE 2021   PHQ-9 Total Score 2     Last GAD7 score on record =   ERNESTO-7 SCORE 2021   Total Score 5     Alcohol Score = 0    HEALTH MAINTENANCE:  Cholesterol:   Recent Labs   Lab Test 14  0000   CHOL 197   HDL 85   LDL 98   TRIG 68   CHOLHDLRATIO 2.32     Last Mammo: 2021, Result: Normal, Next Mammo: Next year  Pap:   Lab Results   Component Value Date    PAP NIL, HPV- 10/27/2020    PAP NIL 10/14/2019    PAP OTHER-NIL, See Result 10/11/2018     Colonoscopy:  2021, Result: Normal, Next Colonoscopy: 5 years.  Dexa:  2019    Health maintenance updated:  yes    HISTORY:  OB History    Para Term  AB Living   2 2 0 2 0 2   SAB IAB Ectopic Multiple Live Births   0 0 0 0 2      # Outcome Date GA Lbr Beni/2nd Weight Sex Delivery Anes PTL Lv   2   35w0d    Vag-Spont   NONI   1   35w0d    Vag-Spont   NONI       Patient Active Problem List   Diagnosis     NO ACTIVE PROBLEMS     Screening for cervical cancer     Past Surgical History:   Procedure Laterality Date     RECTOCELE REPAIR        Social History     Tobacco Use     Smoking status: Never Smoker     Smokeless tobacco: Never Used   Substance Use Topics     Alcohol use: No     Alcohol/week: 0.0 standard drinks      Problem (# of Occurrences) Relation (Name,Age of Onset)    Colon Cancer  "(1) Maternal Grandfather    Coronary Artery Disease (1) Father    Dementia (1) Mother    Diabetes (1) Father    No Known Problems (5) Sister, Brother, Maternal Grandmother, Paternal Grandmother, Other    Parkinsonism (1) Mother            No current outpatient medications on file.     No current facility-administered medications for this visit.     No Known Allergies    Past medical, surgical, social and family histories were reviewed and updated in EPIC.    ROS:   12 point review of systems negative other than symptoms noted below or in the HPI.  No urinary frequency or dysuria, bladder or kidney problems    EXAM:  BP 98/50   Pulse 50   Ht 1.619 m (5' 3.75\")   Wt 48.2 kg (106 lb 3.2 oz)   LMP 02/01/2020 (Approximate)   Breastfeeding No   BMI 18.37 kg/m     BMI: Body mass index is 18.37 kg/m .    PHYSICAL EXAM:  Constitutional:   Appearance: Well nourished, well developed, alert, in no acute distress  Neck:  Lymph Nodes:  No lymphadenopathy present    Thyroid:  Gland size normal, nontender, no nodules or masses present  on palpation  Chest:  Respiratory Effort:  Breathing unlabored  Cardiovascular:    Heart: Auscultation:  Regular rate, normal rhythm, no murmurs present  Breasts: Inspection of Breasts:  No lymphadenopathy present., Palpation of Breasts and Axillae:  No masses present on palpation, no breast tenderness., Axillary Lymph Nodes:  No lymphadenopathy present. and No nodularity, asymmetry or nipple discharge bilaterally.  Gastrointestinal:   Abdominal Examination:  Abdomen nontender to palpation, tone normal without rigidity or guarding, no masses present, umbilicus without lesions   Liver and Spleen:  No hepatomegaly present, liver nontender to palpation    Hernias:  No hernias present  Lymphatic: Lymph Nodes:  No other lymphadenopathy present  Skin:  General Inspection:  No rashes present, no lesions present, no areas of  discoloration  Neurologic:    Mental Status:  Oriented X3.  Normal strength " and tone, sensory exam                grossly normal, mentation intact and speech normal.    Psychiatric:   Mentation appears normal and affect normal/bright.         Pelvic Exam:  External Genitalia:     Normal appearance for age, no discharge present, no tenderness present, no inflammatory lesions present, color normal  Vagina:     Normal vaginal vault without central or paravaginal defects, ATROPHIC  Bladder:     Nontender to palpation  Urethra:   Urethral Body:  Urethra palpation normal, urethra structural support normal   Urethral Meatus:  No erythema or lesions present  Cervix:     Appearance healthy, no lesions present, nontender to palpation, no bleeding present  Uterus:     Nontender to palpation, no masses present, position anteflexed, mobility: normal  Adnexa:     No adnexal tenderness present, no adnexal masses present  Perineum:     Perineum within normal limits, no evidence of trauma, no rashes or skin lesions present  Inguinal Lymph Nodes:     No lymphadenopathy present      COUNSELING:   Reviewed preventive health counseling, as reflected in patient instructions       Regular exercise       Healthy diet/nutrition    BMI: Body mass index is 18.37 kg/m .      ASSESSMENT:  56 year old female with satisfactory annual exam.    ICD-10-CM    1. Encounter for gynecological examination without abnormal finding  Z01.419        PLAN: The patient's mammogram was recently reported as normal.  We will convey her Pap results when available.  We have discussed good nutrition and good vitamin status during the winter with a multivitamin and some extra D      Brandon Wellington MD

## 2021-12-03 ASSESSMENT — PATIENT HEALTH QUESTIONNAIRE - PHQ9: SUM OF ALL RESPONSES TO PHQ QUESTIONS 1-9: 2

## 2021-12-03 ASSESSMENT — ANXIETY QUESTIONNAIRES: GAD7 TOTAL SCORE: 5

## 2021-12-07 LAB
BKR LAB AP GYN ADEQUACY: NORMAL
BKR LAB AP GYN INTERPRETATION: NORMAL
BKR LAB AP HPV REFLEX: NORMAL
BKR LAB AP PREVIOUS ABNORMAL: NORMAL
PATH REPORT.COMMENTS IMP SPEC: NORMAL
PATH REPORT.COMMENTS IMP SPEC: NORMAL
PATH REPORT.RELEVANT HX SPEC: NORMAL

## 2021-12-08 LAB
HUMAN PAPILLOMA VIRUS 16 DNA: NEGATIVE
HUMAN PAPILLOMA VIRUS 18 DNA: NEGATIVE
HUMAN PAPILLOMA VIRUS FINAL DIAGNOSIS: NORMAL
HUMAN PAPILLOMA VIRUS OTHER HR: NEGATIVE

## 2022-01-26 NOTE — PROGRESS NOTES
SUBJECTIVE:                                                   Lauren Stanley is a 56 year old female who presents to clinic today for the following health issue(s):  Patient presents with:  Pre-Op Exam    HPI:  Patient here today for preop examination for a bilateral blepharoplasty done by Dr. Davies.  This is scheduled for 2022.  She has no current concerns.  She does not need Covid testing.    Patient's last menstrual period was 2020 (approximate).    Patient is sexually active, .  Using menopause for contraception.    reports that she has never smoked. She has never used smokeless tobacco.    STD testing offered?  Declined    Health maintenance updated:  no    Today's PHQ-2 Score:   PHQ-2 (  [employer]) 6/10/2021   Q1: Little interest or pleasure in doing things 0   Q2: Feeling down, depressed or hopeless 3   PHQ-2 Score 3   PHQ-2 Total Score (12-17 Years)- Positive if 3 or more points; Administer PHQ-A if positive 3   Q1: Little interest or pleasure in doing things -   Q2: Feeling down, depressed or hopeless -   PHQ-2 Score -     Today's PHQ-9 Score:   PHQ-9 SCORE 2021   PHQ-9 Total Score 2     Today's ERNESTO-7 Score:   ERNESTO-7 SCORE 2021   Total Score 5       Problem list and histories reviewed & adjusted, as indicated.  Additional history: as documented.    Patient Active Problem List   Diagnosis     NO ACTIVE PROBLEMS     Screening for cervical cancer     Past Surgical History:   Procedure Laterality Date     RECTOCELE REPAIR        Social History     Tobacco Use     Smoking status: Never Smoker     Smokeless tobacco: Never Used   Substance Use Topics     Alcohol use: No     Alcohol/week: 0.0 standard drinks      Problem (# of Occurrences) Relation (Name,Age of Onset)    Colon Cancer (1) Maternal Grandfather    Coronary Artery Disease (1) Father    Dementia (1) Mother    Diabetes (1) Father    No Known Problems (5) Sister, Brother, Maternal Grandmother, Paternal  "Grandmother, Other    Parkinsonism (1) Mother            Current Outpatient Medications   Medication Sig     calcipotriene (DOVONOX) 0.005 % external ointment Apply topically 2 times daily     clobetasol (TEMOVATE) 0.05 % external ointment      No current facility-administered medications for this visit.     No Known Allergies    ROS:  12 point review of systems negative other than symptoms noted below or in the HPI.  No urinary frequency or dysuria, bladder or kidney problems      OBJECTIVE:     BP (!) 80/60 (BP Location: Right arm, Patient Position: Sitting, Cuff Size: Adult Regular)   Pulse 70   Resp 16   Ht 1.645 m (5' 4.75\")   Wt 48.4 kg (106 lb 12.8 oz)   LMP 02/01/2020 (Approximate)   BMI 17.91 kg/m    Body mass index is 17.91 kg/m .    Exam:  Constitutional:  Appearance: Well nourished, well developed alert, in no acute distress  Chest:  Respiratory Effort:  Breathing unlabored. Clear to auscultation bilaterally.   Cardiovascular: Heart: Auscultation:  Regular rate, normal rhythm, no murmurs present  Gastrointestinal:  Abdominal Examination:  Abdomen nontender to palpation, tone normal without rigidity or guarding, no masses present, umbilicus without lesions; Liver/Spleen:  No hepatomegaly present, liver nontender to palpation; Hernias:  No hernias present  Skin: General Inspection:  No rashes present, no lesions present, no areas of discoloration.  Neurologic:  Mental Status:  Oriented X3.  Normal strength and tone, sensory exam grossly normal, mentation intact and speech normal.    Psychiatric:  Mentation appears normal and affect normal/bright.     In-Clinic Test Results:  No results found for this or any previous visit (from the past 24 hour(s)).    ASSESSMENT/PLAN:                                                        ICD-10-CM    1. Preop examination  Z01.818    2. Ptosis of both eyelids  H02.403        There are no Patient Instructions on file for this visit.    56-year-old female with a " normal exam today.  She is cleared for surgery and anesthesia.  Consent is per the performing physician.  Her form will be faxed to [phone].  A copy was sent with the patient.    KAUR Richard CNP  [facility] FOR [facility]

## 2022-01-27 ENCOUNTER — OFFICE VISIT (OUTPATIENT)
Dept: OBGYN | Facility: CLINIC | Age: 57
End: 2022-01-27
Payer: COMMERCIAL

## 2022-01-27 ENCOUNTER — TRANSFERRED RECORDS (OUTPATIENT)
Dept: HEALTH INFORMATION MANAGEMENT | Facility: CLINIC | Age: 57
End: 2022-01-27

## 2022-01-27 VITALS
SYSTOLIC BLOOD PRESSURE: 80 MMHG | BODY MASS INDEX: 17.79 KG/M2 | RESPIRATION RATE: 16 BRPM | DIASTOLIC BLOOD PRESSURE: 60 MMHG | WEIGHT: 106.8 LBS | HEART RATE: 70 BPM | HEIGHT: 65 IN

## 2022-01-27 DIAGNOSIS — Z01.818 PREOP EXAMINATION: Primary | ICD-10-CM

## 2022-01-27 DIAGNOSIS — H02.403 PTOSIS OF BOTH EYELIDS: ICD-10-CM

## 2022-01-27 PROCEDURE — 99213 OFFICE O/P EST LOW 20 MIN: CPT | Performed by: NURSE PRACTITIONER

## 2022-01-27 RX ORDER — CLOBETASOL PROPIONATE 0.5 MG/G
OINTMENT TOPICAL
COMMUNITY
Start: 2022-01-25

## 2022-01-27 RX ORDER — CALCIPOTRIENE 50 UG/G
OINTMENT TOPICAL 2 TIMES DAILY
COMMUNITY
Start: 2022-01-25

## 2022-01-27 ASSESSMENT — MIFFLIN-ST. JEOR: SCORE: 1076.35

## 2022-06-01 ENCOUNTER — TELEPHONE (OUTPATIENT)
Dept: OBGYN | Facility: CLINIC | Age: 57
End: 2022-06-01
Payer: COMMERCIAL

## 2022-06-01 NOTE — TELEPHONE ENCOUNTER
Patient is going to [place] for an elevation climb and asked if she could get altitude pills prescribed by Cecille JOHANSEN. She knows this may not be possible but wanted to ask the nurse.      1400' altitude climb is planned.  Pt was told to get a Rx prior to the trip to help with altitude sickness.  Routing pt [employer] message to provider to advise.  Ish Nelson RN on 6/1/2022 at 12:55 PM

## 2022-06-02 NOTE — TELEPHONE ENCOUNTER
Called pt and recommended travel clinic or PCP.  Pt does not have a PCP as Dr Wellington was her PCP.  Recommended [facility] or [facility] clinic. She could check her insurance network and choose one from that list as well.    Pt verbalized understanding, in agreement with plan, and voiced no further questions.  Cecille Canchola RN on 6/2/2022 at 8:24 AM

## 2022-10-15 ENCOUNTER — HEALTH MAINTENANCE LETTER (OUTPATIENT)
Age: 57
End: 2022-10-15

## 2022-12-07 NOTE — PROGRESS NOTES
"Lauren is a 57 year old  female who presents for annual exam.     Besides routine health maintenance, she has no other health concerns today .    HPI:  The patient's PCP is  [facility].  Patient here today for her annual GYN exam mammogram and bone scan.  She is feeling well.  She is having more nocturia she is up 2-3 times per night.  She does not have a sensation during the day.  She does not feel as if anything is \"falling out\".  She denies any true dysuria.  She does have involuntary leaking and does wear a pad on a daily basis.          GYNECOLOGIC HISTORY:    Patient's last menstrual period was 2020 (approximate).     Her current contraception method is: menopause.  She  reports that she has never smoked. She has never used smokeless tobacco.    Patient is not sexually active.  STD testing offered?  Declined  Last PHQ-9 score on record =   PHQ-9 SCORE 2022   PHQ-9 Total Score 5     Last GAD7 score on record =   ERNESTO-7 SCORE 2022   Total Score 6     Alcohol Score = 0    HEALTH MAINTENANCE:  Cholesterol: (  Cholesterol   Date Value Ref Range Status   2014 197 115 - 199 mg/dL Final      Last Mammo: 21, Result: Normal, Next Mammo: Today   Pap: (  Lab Results   Component Value Date    GYNINTERP  2021     Negative for Intraepithelial Lesion or Malignancy (NILM)    PAP NIL 10/27/2020    PAP NIL 10/14/2019    PAP OTHER-NIL, See Result 10/11/2018      Colonoscopy:  21, Result: Normal, Next Colonoscopy:    Dexa:  10/02/2017    Health maintenance updated:  no    HISTORY:  OB History    Para Term  AB Living   2 2 0 2 0 2   SAB IAB Ectopic Multiple Live Births   0 0 0 0 2      # Outcome Date GA Lbr Beni/2nd Weight Sex Delivery Anes PTL Lv   2   35w0d    Vag-Spont   NONI   1   35w0d    Vag-Spont   NONI       Patient Active Problem List   Diagnosis     NO ACTIVE PROBLEMS     Screening for cervical cancer     Past Surgical " "History:   Procedure Laterality Date     RECTOCELE REPAIR        Social History     Tobacco Use     Smoking status: Never     Smokeless tobacco: Never   Substance Use Topics     Alcohol use: No     Alcohol/week: 0.0 standard drinks      Problem (# of Occurrences) Relation (Name,Age of Onset)    Parkinsonism (1) Mother    Dementia (1) Mother    Diabetes (1) Father    Coronary Artery Disease (1) Father    Colon Cancer (1) Maternal Grandfather    No Known Problems (5) Sister, Brother, Maternal Grandmother, Paternal Grandmother, Other            Current Outpatient Medications   Medication Sig     alendronate (FOSAMAX) 70 MG tablet Take 1 tablet (70 mg) by mouth every 7 days     calcipotriene (DOVONOX) 0.005 % external ointment Apply topically 2 times daily     clobetasol (TEMOVATE) 0.05 % external ointment      No current facility-administered medications for this visit.     No Known Allergies    Past medical, surgical, social and family histories were reviewed and updated in EPIC.    ROS:   12 point review of systems negative other than symptoms noted below or in the HPI.  POSITIVE for:, nocturia x 2-3    EXAM:  /68   Ht 1.645 m (5' 4.76\")   Wt 51.4 kg (113 lb 6.4 oz)   LMP 02/01/2020 (Approximate)   BMI 19.01 kg/m     BMI: Body mass index is 19.01 kg/m .    PHYSICAL EXAM:  Constitutional:   Appearance: Well nourished, well developed, alert, in no acute distress  Neck:  Lymph Nodes:  No lymphadenopathy present    Thyroid:  Gland size normal, nontender, no nodules or masses present  on palpation  Chest:  Respiratory Effort:  Breathing unlabored  Cardiovascular:    Heart: Auscultation:  Regular rate, normal rhythm, no murmurs present  Breasts: Inspection of Breasts:  No lymphadenopathy present., Palpation of Breasts and Axillae:  No masses present on palpation, no breast tenderness., Axillary Lymph Nodes:  No lymphadenopathy present. and No nodularity, asymmetry or nipple discharge " bilaterally.  Gastrointestinal:   Abdominal Examination:  Abdomen nontender to palpation, tone normal without rigidity or guarding, no masses present, umbilicus without lesions   Liver and Spleen:  No hepatomegaly present, liver nontender to palpation    Hernias:  No hernias present  Lymphatic: Lymph Nodes:  No other lymphadenopathy present  Skin:  General Inspection:  No rashes present, no lesions present, no areas of  discoloration  Neurologic:    Mental Status:  Oriented X3.  Normal strength and tone, sensory exam                grossly normal, mentation intact and speech normal.    Psychiatric:   Mentation appears normal and affect normal/bright.         Pelvic Exam:  External Genitalia:     Normal appearance for age, no discharge present, no tenderness present, no inflammatory lesions present, color normal.  Very small introitus due to a past perineal tear during delivery.  Vagina:     Normal vaginal vault without central or paravaginal defects, no discharge present, no inflammatory lesions present, no masses present  Bladder:     Nontender to palpation  Urethra:   Urethral Body:  Urethra palpation normal, urethra structural support normal   Urethral Meatus:  No erythema or lesions present  Cervix:     Appearance healthy, no lesions present, nontender to palpation, no bleeding present  Uterus:     Uterus: firm, normal sized and nontender, midplane in position.   Adnexa:     No adnexal tenderness present, no adnexal masses present.  Fullness on the right  Perineum:     Perineum within normal limits, no evidence of trauma, no rashes or skin lesions present  Anus:     Anus within normal limits, no hemorrhoids present  Inguinal Lymph Nodes:     No lymphadenopathy present  Pubic Hair:     Normal pubic hair distribution for age  Genitalia and Groin:     No rashes present, no lesions present, no areas of discoloration, no masses present      COUNSELING:   Special attention given to:        Regular exercise        Healthy diet/nutrition       Osteoporosis prevention/bone health       (Danielle)menopause management    BMI: Body mass index is 19.01 kg/m .      ASSESSMENT:  57 year old female with satisfactory annual exam.    ICD-10-CM    1. Encounter for gynecological examination without abnormal finding  Z01.419 Pap screen with HPV - recommended age 30 - 65 years      2. Nocturia  R35.1 US Transvaginal Pelvic Non-OB      3. Osteopenia, senile  M85.80 alendronate (FOSAMAX) 70 MG tablet     DX Hip/Pelvis/Spine      4. Postmenopausal  Z78.0 DX Hip/Pelvis/Spine      5. Special screening for osteoporosis  Z13.820 DX Hip/Pelvis/Spine          PLAN:  Thin 57-year-old postmenopausal female with nocturia.  She has some fullness on the right side of the pelvis.  We have invited her back for transvaginal ultrasound.  She has good bladder support.  We encouraged her to practice Pilates for pelvic floor integrity.    DEXA scan shows worsening osteopenia at the hips and spine.  We will have her start Fosamax.  Methods risk benefits were discussed.  Supplements were also discussed to be sure that she is getting adequate supplementation.  We would like her to repeat her bone scan again in 1 year.    KAUR Richard CNP

## 2022-12-09 ENCOUNTER — OFFICE VISIT (OUTPATIENT)
Dept: OBGYN | Facility: CLINIC | Age: 57
End: 2022-12-09
Payer: COMMERCIAL

## 2022-12-09 ENCOUNTER — ANCILLARY PROCEDURE (OUTPATIENT)
Dept: MAMMOGRAPHY | Facility: CLINIC | Age: 57
End: 2022-12-09
Payer: COMMERCIAL

## 2022-12-09 ENCOUNTER — ANCILLARY PROCEDURE (OUTPATIENT)
Dept: BONE DENSITY | Facility: CLINIC | Age: 57
End: 2022-12-09
Payer: COMMERCIAL

## 2022-12-09 VITALS
HEIGHT: 65 IN | BODY MASS INDEX: 18.89 KG/M2 | WEIGHT: 113.4 LBS | SYSTOLIC BLOOD PRESSURE: 100 MMHG | DIASTOLIC BLOOD PRESSURE: 68 MMHG

## 2022-12-09 DIAGNOSIS — Z78.0 ASYMPTOMATIC POSTMENOPAUSAL STATE: ICD-10-CM

## 2022-12-09 DIAGNOSIS — Z13.820 SPECIAL SCREENING FOR OSTEOPOROSIS: ICD-10-CM

## 2022-12-09 DIAGNOSIS — Z12.31 VISIT FOR SCREENING MAMMOGRAM: ICD-10-CM

## 2022-12-09 DIAGNOSIS — R35.1 NOCTURIA: ICD-10-CM

## 2022-12-09 DIAGNOSIS — M85.80 OSTEOPENIA, SENILE: ICD-10-CM

## 2022-12-09 DIAGNOSIS — Z78.0 POSTMENOPAUSAL: ICD-10-CM

## 2022-12-09 DIAGNOSIS — Z01.419 ENCOUNTER FOR GYNECOLOGICAL EXAMINATION WITHOUT ABNORMAL FINDING: Primary | ICD-10-CM

## 2022-12-09 PROCEDURE — 77080 DXA BONE DENSITY AXIAL: CPT | Performed by: OBSTETRICS & GYNECOLOGY

## 2022-12-09 PROCEDURE — 87624 HPV HI-RISK TYP POOLED RSLT: CPT | Performed by: NURSE PRACTITIONER

## 2022-12-09 PROCEDURE — 99396 PREV VISIT EST AGE 40-64: CPT | Performed by: NURSE PRACTITIONER

## 2022-12-09 PROCEDURE — 77067 SCR MAMMO BI INCL CAD: CPT | Mod: TC | Performed by: RADIOLOGY

## 2022-12-09 PROCEDURE — G0123 SCREEN CERV/VAG THIN LAYER: HCPCS | Performed by: NURSE PRACTITIONER

## 2022-12-09 PROCEDURE — 77063 BREAST TOMOSYNTHESIS BI: CPT | Mod: TC | Performed by: RADIOLOGY

## 2022-12-09 RX ORDER — ALENDRONATE SODIUM 70 MG/1
70 TABLET ORAL
Qty: 12 TABLET | Refills: 3 | Status: SHIPPED | OUTPATIENT
Start: 2022-12-09 | End: 2023-12-28

## 2022-12-09 ASSESSMENT — ANXIETY QUESTIONNAIRES
GAD7 TOTAL SCORE: 6
GAD7 TOTAL SCORE: 6
5. BEING SO RESTLESS THAT IT IS HARD TO SIT STILL: SEVERAL DAYS
3. WORRYING TOO MUCH ABOUT DIFFERENT THINGS: SEVERAL DAYS
1. FEELING NERVOUS, ANXIOUS, OR ON EDGE: SEVERAL DAYS
7. FEELING AFRAID AS IF SOMETHING AWFUL MIGHT HAPPEN: NOT AT ALL
2. NOT BEING ABLE TO STOP OR CONTROL WORRYING: SEVERAL DAYS
IF YOU CHECKED OFF ANY PROBLEMS ON THIS QUESTIONNAIRE, HOW DIFFICULT HAVE THESE PROBLEMS MADE IT FOR YOU TO DO YOUR WORK, TAKE CARE OF THINGS AT HOME, OR GET ALONG WITH OTHER PEOPLE: SOMEWHAT DIFFICULT
6. BECOMING EASILY ANNOYED OR IRRITABLE: SEVERAL DAYS

## 2022-12-09 ASSESSMENT — PATIENT HEALTH QUESTIONNAIRE - PHQ9
5. POOR APPETITE OR OVEREATING: SEVERAL DAYS
SUM OF ALL RESPONSES TO PHQ QUESTIONS 1-9: 5

## 2022-12-12 ENCOUNTER — TELEPHONE (OUTPATIENT)
Dept: OBGYN | Facility: CLINIC | Age: 57
End: 2022-12-12

## 2022-12-12 NOTE — TELEPHONE ENCOUNTER
Patient would like a nurse to call her back today. She has a couple of questions regarding her previous visit with SM.

## 2022-12-12 NOTE — TELEPHONE ENCOUNTER
12/9/22 annual w S Mestad NP  Bone scan done  Started on alendronate (FOSAMAX) 70 MG tablet    Pt is aware of plan above.  Pt has not started the Fosamax and did some further research on long term effects and wants to hold off on this as she would like to try alternatives but also discuss this w S Mestad NP.  OR can she spend a year focusing on supplements and diet changes.    Has future US apt 12/28 w S Mestad NP but no f/u visit scheduled.  Recommended pt have an apt after US so she can discuss the US results AND the bone scan/fosamax rx at length w [facility].    Pt is very agreeable to this plan and scheduled for phone visit f/up w HERIBERTO 1/3/22 the following week d/t SM out of office the week of the US.    Cecille Canchola RN on 12/12/2022 at 9:43 AM

## 2022-12-28 ENCOUNTER — ANCILLARY PROCEDURE (OUTPATIENT)
Dept: ULTRASOUND IMAGING | Facility: CLINIC | Age: 57
End: 2022-12-28
Attending: NURSE PRACTITIONER
Payer: COMMERCIAL

## 2022-12-28 DIAGNOSIS — R35.1 NOCTURIA: ICD-10-CM

## 2022-12-28 PROCEDURE — 76830 TRANSVAGINAL US NON-OB: CPT | Performed by: OBSTETRICS & GYNECOLOGY

## 2023-01-03 ENCOUNTER — VIRTUAL VISIT (OUTPATIENT)
Dept: OBGYN | Facility: CLINIC | Age: 58
End: 2023-01-03
Payer: COMMERCIAL

## 2023-01-03 DIAGNOSIS — N95.1 SYMPTOMS, SUCH AS FLUSHING, SLEEPLESSNESS, HEADACHE, LACK OF CONCENTRATION, ASSOCIATED WITH THE MENOPAUSE: ICD-10-CM

## 2023-01-03 DIAGNOSIS — M85.80 OSTEOPENIA, SENILE: Primary | ICD-10-CM

## 2023-01-03 PROCEDURE — 99213 OFFICE O/P EST LOW 20 MIN: CPT | Mod: TEL | Performed by: NURSE PRACTITIONER

## 2023-01-03 RX ORDER — MEDROXYPROGESTERONE ACETATE 5 MG
5 TABLET ORAL DAILY
Qty: 90 TABLET | Refills: 3 | Status: SHIPPED | OUTPATIENT
Start: 2023-01-03 | End: 2023-12-28

## 2023-01-03 RX ORDER — ESTRADIOL 1 MG/1
1 TABLET ORAL DAILY
Qty: 90 TABLET | Refills: 3 | Status: SHIPPED | OUTPATIENT
Start: 2023-01-03 | End: 2023-12-28

## 2023-01-03 NOTE — PROGRESS NOTES
Lauren Stanley is a 57 year old female who is being evaluated via a billable telephone visit.      What phone number would you like to be contacted at? [phone]  How would you like to obtain your AVS? Geovanniharjace      Originating Location (pt. Location): H    Distant Location (provider location):  On-site      SUBJECTIVE:                                                   Lauren Stanley is a 57 year old female who presents for virtual visit today for the following health issue(s):  Patient presents with:  Follow Up      Additional information: Patient is following up on Dexa and Ultra Sound results    HPI:  Phone call with patient regarding her ultrasound results and DEXA scan.  We performed an ultrasound for her due to right-sided pelvic fullness at her exam last month.  She had also been having some new nocturia.    DEXA scan prior to  was in 2019.  She had a considerable decline in her bone density and we discussed the use of Fosamax.  The patient research side effects and is very hesitant to start any bisphosphonate therapy at this time.  She is on no hormone replacement therapy and does struggle with hot flashes night sweats and insomnia.  She has no vaginal bleeding.    Patient's last menstrual period was 2020 (approximate)..     Patient is not sexually active, .  Using menopause for contraception.    reports that she has never smoked. She has never used smokeless tobacco.      Health maintenance updated:  yes    Today's PHQ-2 Score:   PHQ-2 (  [employer]) 2022   Q1: Little interest or pleasure in doing things 1   Q2: Feeling down, depressed or hopeless 1   PHQ-2 Score 2   PHQ-2 Total Score (12-17 Years)- Positive if 3 or more points; Administer PHQ-A if positive -   Q1: Little interest or pleasure in doing things -   Q2: Feeling down, depressed or hopeless -   PHQ-2 Score -     Today's PHQ-9 Score:   PHQ-9 SCORE 2022   PHQ-9 Total Score 5     Today's ERNESTO-7 Score:   ERNESTO-7 SCORE  12/9/2022   Total Score 6       Problem list and histories reviewed & adjusted, as indicated.  Additional history: as documented.    Patient Active Problem List   Diagnosis     NO ACTIVE PROBLEMS     Screening for cervical cancer     Past Surgical History:   Procedure Laterality Date     RECTOCELE REPAIR        Social History     Tobacco Use     Smoking status: Never     Smokeless tobacco: Never   Substance Use Topics     Alcohol use: No     Alcohol/week: 0.0 standard drinks      Problem (# of Occurrences) Relation (Name,Age of Onset)    Parkinsonism (1) Mother    Dementia (1) Mother    Diabetes (1) Father    Coronary Artery Disease (1) Father    Colon Cancer (1) Maternal Grandfather    No Known Problems (5) Sister, Brother, Maternal Grandmother, Paternal Grandmother, Other            Current Outpatient Medications   Medication Sig     calcipotriene (DOVONOX) 0.005 % external ointment Apply topically 2 times daily     clobetasol (TEMOVATE) 0.05 % external ointment      estradiol (ESTRACE) 1 MG tablet Take 1 tablet (1 mg) by mouth daily     medroxyPROGESTERone (PROVERA) 5 MG tablet Take 1 tablet (5 mg) by mouth daily     alendronate (FOSAMAX) 70 MG tablet Take 1 tablet (70 mg) by mouth every 7 days (Patient not taking: Reported on 1/3/2023)     No current facility-administered medications for this visit.     No Known Allergies      OBJECTIVE:     No vitals were obtained today due to virtual visit.    Physical Exam  GENERAL: Healthy, alert and no distress  EYES: Eyes grossly normal to inspection.  No discharge or erythema, or obvious scleral/conjunctival abnormalities.  RESP: No audible wheeze, cough, or visible cyanosis.  No visible retractions or increased work of breathing.    SKIN: Visible skin clear. No significant rash, abnormal pigmentation or lesions.  NEURO: Cranial nerves grossly intact.  Mentation and speech appropriate for age.  PSYCH: Mentation appears normal, affect normal/bright, judgement and insight  intact, normal speech and appearance well-groomed.          ASSESSMENT/PLAN:                                                      Phone call duration: 11 minutes      ICD-10-CM    1. Osteopenia, senile  M85.80 estradiol (ESTRACE) 1 MG tablet     medroxyPROGESTERone (PROVERA) 5 MG tablet      2. Symptoms, such as flushing, sleeplessness, headache, lack of concentration, associated with the menopause  N95.1 estradiol (ESTRACE) 1 MG tablet     medroxyPROGESTERone (PROVERA) 5 MG tablet          There are no Patient Instructions on file for this visit.    Ultrasound is unremarkable.  She has a very thin EMS.  Ovaries are within normal limits.  Her bone scan again showed markable decline from 20 19-20 22.  With her hesitancy to start a bisphosphonate therapy we discussed initiating hormone replacement therapy that will act as twofold therapy for her menopausal symptoms but also her bone density.  We have asked her to repeat a bone density in 2023 at her annual exam.  Methods risk benefits were discussed.    KAUR Richard CNP  M [facility] [place]

## 2023-08-07 DIAGNOSIS — Z78.0 ASYMPTOMATIC POSTMENOPAUSAL STATE: Primary | ICD-10-CM

## 2023-11-09 ENCOUNTER — PATIENT OUTREACH (OUTPATIENT)
Dept: CARE COORDINATION | Facility: CLINIC | Age: 58
End: 2023-11-09
Payer: COMMERCIAL

## 2023-11-23 ENCOUNTER — PATIENT OUTREACH (OUTPATIENT)
Dept: CARE COORDINATION | Facility: CLINIC | Age: 58
End: 2023-11-23
Payer: COMMERCIAL

## 2023-12-20 NOTE — PROGRESS NOTES
Lauren is a 58 year old  female who presents for annual exam.     Besides routine health maintenance,  she would like to discuss bone density.    HPI:  Here today for yearly exam --doing well.  Former patient of Dr. Wellington.  Postmenopausal.  No vb/spotting.  Has had night sweats for years --not problematic.  Not SA -- was treated for bladder cancer 11yrs ago.  Some constipation --improves with fiber/hydration.  Some bladder leaking with exercise.  Wears a panty liner daily.  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); 2 grown children --26yo son in [place] and 29yo daughter  this year  -staying active with walking, running, weight training both at the gym and at home  +mammo today; +SBE --no issues  No PCP --had normal fasting bloodwork last year  -up to date on colonoscopy  -had bone scan last year with osteopenia in both spine and hips (spine -2.3, L hip -2.0, R hip -2.2, FRAX risks 14% and 1.3%) --Cecille David had recommended either fosamax or HRT --neither of which Lauren opted to start  -had flu shot; agrees to covid booster today      GYNECOLOGIC HISTORY:    Patient's last menstrual period was 2020 (approximate).      Her current contraception method is: menopause and not sexually active.  She  reports that she has never smoked. She has never used smokeless tobacco.    Patient is not sexually active.  STD testing offered?  Declined  Last PHQ-9 score on record =       2023     8:34 AM   PHQ-9 SCORE   PHQ-9 Total Score 4     Last GAD7 score on record =       2023     8:34 AM   ERNESTO-7 SCORE   Total Score 1     Alcohol Score = 0    HEALTH MAINTENANCE:  Cholesterol:   Cholesterol   Date Value Ref Range Status   2014 197 115 - 199 mg/dL Final   Last Mammo: One year ago, Result: Normal, Next Mammo: Today   Pap:   Lab Results   Component Value Date    GYNINTERP  2022     Negative for Intraepithelial Lesion or Malignancy (NILM)     GYNINTERP  2021     Negative for Intraepithelial Lesion or Malignancy (NILM)    PAP NIL 10/27/2020    PAP NIL 10/14/2019    PAP OTHER-NIL, See Result 10/11/2018    2022 WNL HPV (-)neg  Colonoscopy:  , Result: Normal, Next Colonoscopy: 5 years.  Dexa:  2022  FINDINGS:               Lumbar Spine (L1-L4)      T-score:  -2.3               Left Femoral Neck            T-score:  -2.0               Right Femoral Neck          T-score:  -2.2               Lumbar (L1-L4) BMD: 0.913  Previous: 1.036                          Total Hip Mean BMD: 0.753  Previous: 0.847    Health maintenance updated:  yes    HISTORY:  OB History    Para Term  AB Living   2 2 0 2 0 2   SAB IAB Ectopic Multiple Live Births   0 0 0 0 2      # Outcome Date GA Lbr Beni/2nd Weight Sex Delivery Anes PTL Lv   2   35w0d    Vag-Spont   NONI   1   35w0d    Vag-Spont   NONI       Patient Active Problem List   Diagnosis    Screening for cervical cancer    Osteopenia    Hormone replacement therapy     Past Surgical History:   Procedure Laterality Date    RECTOCELE REPAIR        Social History     Tobacco Use    Smoking status: Never    Smokeless tobacco: Never   Substance Use Topics    Alcohol use: No     Alcohol/week: 0.0 standard drinks of alcohol      Problem (# of Occurrences) Relation (Name,Age of Onset)    Parkinsonism (1) Mother    Dementia (1) Mother    Diabetes (1) Father    Coronary Artery Disease (1) Father    Colon Cancer (1) Maternal Grandfather    No Known Problems (6) Sister, Brother, Maternal Grandmother, Paternal Grandmother, Paternal Grandfather, Other              Current Outpatient Medications   Medication Sig    calcipotriene (DOVONOX) 0.005 % external ointment Apply topically 2 times daily    clobetasol (TEMOVATE) 0.05 % external ointment      No current facility-administered medications for this visit.     No Known Allergies    Past medical, surgical, social and family histories were reviewed  "and updated in EPIC.    ROS:   12 point review of systems negative other than symptoms noted below or in the HPI.  No urinary frequency or dysuria, bladder or kidney problems    EXAM:  /64   Ht 1.651 m (5' 5\")   Wt 49.9 kg (110 lb)   LMP 02/01/2020 (Approximate)   BMI 18.30 kg/m     BMI: Body mass index is 18.3 kg/m .    PHYSICAL EXAM:  Constitutional:   Appearance: Well nourished, well developed, alert, in no acute distress  Neck:  Lymph Nodes:  No lymphadenopathy present    Thyroid:  Gland size normal, nontender, no nodules or masses present  on palpation  Chest:  Respiratory Effort:  Breathing unlabored  Cardiovascular:    Heart: Auscultation:  Regular rate, normal rhythm, no murmurs present  Breasts: Palpation of Breasts and Axillae:  No masses present on palpation, no breast tenderness., Axillary Lymph Nodes:  No lymphadenopathy present., and No nodularity, asymmetry or nipple discharge bilaterally.  Gastrointestinal:   Abdominal Examination:  Abdomen nontender to palpation, tone normal without rigidity or guarding, no masses present, umbilicus without lesions   Liver and Spleen:  No hepatomegaly present, liver nontender to palpation    Hernias:  No hernias present  Lymphatic: Lymph Nodes:  No other lymphadenopathy present  Skin:  General Inspection:  No rashes present, no lesions present, no areas of  discoloration  Neurologic:    Mental Status:  Oriented X3.  Normal strength and tone, sensory exam                grossly normal, mentation intact and speech normal.    Psychiatric:   Mentation appears normal and affect normal/bright.         Pelvic Exam:  External Genitalia:     Normal appearance for age, no discharge present, no tenderness present, no inflammatory lesions present, color normal  Vagina:     Normal vaginal vault without central or paravaginal defects, ATROPHIC  Bladder:     Nontender to palpation  Urethra:   Urethral Body:  Urethra palpation normal, urethra structural support " normal   Urethral Meatus:  No erythema or lesions present  Cervix:     Appearance healthy, no lesions present, nontender to palpation, no bleeding present  Uterus:     Nontender to palpation, no masses present, position anteflexed, mobility: normal  Adnexa:     No adnexal tenderness present, no adnexal masses present  Perineum:     Perineum within normal limits, no evidence of trauma, no rashes or skin lesions present  Inguinal Lymph Nodes:     No lymphadenopathy present    COUNSELING:   Reviewed preventive health counseling, as reflected in patient instructions  Special attention given to:        Regular exercise       Healthy diet/nutrition       Osteoporosis prevention/bone health       Colorectal Cancer Screening       (Danielle)menopause management    BMI: Body mass index is 18.3 kg/m .      ASSESSMENT:  58 year old female with satisfactory annual exam.    ICD-10-CM    1. Encounter for gynecological examination without abnormal finding  Z01.419       2. Osteopenia of multiple sites  M85.89           PLAN:  Patient Instructions   Follow up with your primary care provider for your other medical problems.  Continue self breast exam.  Increase physical activity and exercise.  Usual safety and preventative measures counseling done.  Last pap smear (2022) was normal and negative for the DNA of high risk HPV subtypes.  No pap was obtained this year.  This was discussed with the patient and she agrees with the plan.  Discussed Osteoporosis screening as well as calcium and Vitamin D recommendations.  Will send results from bone scan today by [employer] and US mail.  Encouraged Lauren to keep up with weight bearing exercise as well as calcium (1000-1200mg/day) and vitamin D (800-1000IU) supplementation.  Covid booster today.       Michelle Jane MD    Answers submitted by the patient for this visit:  Annual Preventive Visit (Submitted on 12/21/2023)  Chief Complaint: Annual Exam:  Frequency of exercise:: 6-7  days/week  Getting at least 3 servings of Calcium per day:: NO  Diet:: Regular (no restrictions)  Taking medications regularly:: Yes  Medication side effects:: Not applicable  Bi-annual eye exam:: Yes  Dental care twice a year:: Yes  Sleep apnea or symptoms of sleep apnea:: None  abdominal pain: No  Blood in stool: No  Blood in urine: No  chest pain: No  chills: No  congestion: No  constipation: Yes  cough: No  diarrhea: No  dizziness: No  ear pain: No  eye pain: No  nervous/anxious: No  fever: No  frequency: No  genital sores: No  headaches: No  hearing loss: No  heartburn: No  arthralgias: No  joint swelling: No  peripheral edema: No  mood changes: Yes  myalgias: No  nausea: No  dysuria: No  palpitations: No  Skin sensation changes: No  sore throat: No  urgency: No  rash: No  shortness of breath: No  visual disturbance: Yes  weakness: No  pelvic pain: No  vaginal bleeding: No  vaginal discharge: No  tenderness: No  breast mass: No  breast discharge: No  Exercise outside of work (Submitted on 12/21/2023)  Chief Complaint: Annual Exam:  Duration of exercise:: 45-60 minutes

## 2023-12-21 ASSESSMENT — ENCOUNTER SYMPTOMS
DYSURIA: 0
ARTHRALGIAS: 0
ABDOMINAL PAIN: 0
CHILLS: 0
SHORTNESS OF BREATH: 0
SORE THROAT: 0
HEMATOCHEZIA: 0
COUGH: 0
EYE PAIN: 0
FEVER: 0
PARESTHESIAS: 0
DIARRHEA: 0
CONSTIPATION: 1
NAUSEA: 0
WEAKNESS: 0
HEARTBURN: 0
PALPITATIONS: 0
NERVOUS/ANXIOUS: 0
HEMATURIA: 0
HEADACHES: 0
DIZZINESS: 0
BREAST MASS: 0
FREQUENCY: 0
JOINT SWELLING: 0
MYALGIAS: 0

## 2023-12-26 ENCOUNTER — PATIENT OUTREACH (OUTPATIENT)
Dept: CARE COORDINATION | Facility: CLINIC | Age: 58
End: 2023-12-26
Payer: COMMERCIAL

## 2023-12-27 PROBLEM — Z79.890 HORMONE REPLACEMENT THERAPY: Status: ACTIVE | Noted: 2023-12-27

## 2023-12-28 ENCOUNTER — ANCILLARY PROCEDURE (OUTPATIENT)
Dept: MAMMOGRAPHY | Facility: CLINIC | Age: 58
End: 2023-12-28
Attending: NURSE PRACTITIONER
Payer: COMMERCIAL

## 2023-12-28 ENCOUNTER — OFFICE VISIT (OUTPATIENT)
Dept: OBGYN | Facility: CLINIC | Age: 58
End: 2023-12-28
Attending: NURSE PRACTITIONER
Payer: COMMERCIAL

## 2023-12-28 ENCOUNTER — ANCILLARY PROCEDURE (OUTPATIENT)
Dept: BONE DENSITY | Facility: CLINIC | Age: 58
End: 2023-12-28
Attending: NURSE PRACTITIONER
Payer: COMMERCIAL

## 2023-12-28 VITALS
WEIGHT: 110 LBS | BODY MASS INDEX: 18.33 KG/M2 | HEIGHT: 65 IN | DIASTOLIC BLOOD PRESSURE: 64 MMHG | SYSTOLIC BLOOD PRESSURE: 112 MMHG

## 2023-12-28 DIAGNOSIS — Z23 NEED FOR COVID-19 VACCINE: ICD-10-CM

## 2023-12-28 DIAGNOSIS — Z78.0 ASYMPTOMATIC POSTMENOPAUSAL STATE: ICD-10-CM

## 2023-12-28 DIAGNOSIS — Z12.31 VISIT FOR SCREENING MAMMOGRAM: ICD-10-CM

## 2023-12-28 DIAGNOSIS — M85.89 OSTEOPENIA OF MULTIPLE SITES: ICD-10-CM

## 2023-12-28 DIAGNOSIS — Z01.419 ENCOUNTER FOR GYNECOLOGICAL EXAMINATION WITHOUT ABNORMAL FINDING: Primary | ICD-10-CM

## 2023-12-28 PROCEDURE — 99396 PREV VISIT EST AGE 40-64: CPT | Performed by: OBSTETRICS & GYNECOLOGY

## 2023-12-28 PROCEDURE — 77063 BREAST TOMOSYNTHESIS BI: CPT | Mod: TC | Performed by: RADIOLOGY

## 2023-12-28 PROCEDURE — 90480 ADMN SARSCOV2 VAC 1/ONLY CMP: CPT | Performed by: OBSTETRICS & GYNECOLOGY

## 2023-12-28 PROCEDURE — 91320 SARSCV2 VAC 30MCG TRS-SUC IM: CPT | Performed by: OBSTETRICS & GYNECOLOGY

## 2023-12-28 PROCEDURE — 77080 DXA BONE DENSITY AXIAL: CPT | Mod: TC | Performed by: RADIOLOGY

## 2023-12-28 PROCEDURE — 77067 SCR MAMMO BI INCL CAD: CPT | Mod: TC | Performed by: RADIOLOGY

## 2023-12-28 ASSESSMENT — ANXIETY QUESTIONNAIRES
GAD7 TOTAL SCORE: 1
7. FEELING AFRAID AS IF SOMETHING AWFUL MIGHT HAPPEN: NOT AT ALL
GAD7 TOTAL SCORE: 1
3. WORRYING TOO MUCH ABOUT DIFFERENT THINGS: NOT AT ALL
6. BECOMING EASILY ANNOYED OR IRRITABLE: SEVERAL DAYS
5. BEING SO RESTLESS THAT IT IS HARD TO SIT STILL: NOT AT ALL
1. FEELING NERVOUS, ANXIOUS, OR ON EDGE: NOT AT ALL
2. NOT BEING ABLE TO STOP OR CONTROL WORRYING: NOT AT ALL
IF YOU CHECKED OFF ANY PROBLEMS ON THIS QUESTIONNAIRE, HOW DIFFICULT HAVE THESE PROBLEMS MADE IT FOR YOU TO DO YOUR WORK, TAKE CARE OF THINGS AT HOME, OR GET ALONG WITH OTHER PEOPLE: NOT DIFFICULT AT ALL

## 2023-12-28 ASSESSMENT — PATIENT HEALTH QUESTIONNAIRE - PHQ9
SUM OF ALL RESPONSES TO PHQ QUESTIONS 1-9: 4
5. POOR APPETITE OR OVEREATING: NOT AT ALL

## 2023-12-28 NOTE — PATIENT INSTRUCTIONS
Follow up with your primary care provider for your other medical problems.  Continue self breast exam.  Increase physical activity and exercise.  Usual safety and preventative measures counseling done.  Last pap smear (2022) was normal and negative for the DNA of high risk HPV subtypes.  No pap was obtained this year.  This was discussed with the patient and she agrees with the plan.  Discussed Osteoporosis screening as well as calcium and Vitamin D recommendations.  Will send results from bone scan today by [employer] and US mail.  Encouraged Lauren to keep up with weight bearing exercise as well as calcium (1000-1200mg/day) and vitamin D (800-1000IU) supplementation.  Covid booster today.

## 2024-01-09 ENCOUNTER — PATIENT OUTREACH (OUTPATIENT)
Dept: CARE COORDINATION | Facility: CLINIC | Age: 59
End: 2024-01-09
Payer: COMMERCIAL

## 2024-01-10 ENCOUNTER — TELEPHONE (OUTPATIENT)
Dept: OBGYN | Facility: CLINIC | Age: 59
End: 2024-01-10
Payer: COMMERCIAL

## 2024-01-10 ENCOUNTER — MYC MEDICAL ADVICE (OUTPATIENT)
Dept: OBGYN | Facility: CLINIC | Age: 59
End: 2024-01-10
Payer: COMMERCIAL

## 2024-01-10 DIAGNOSIS — M85.80 OSTEOPENIA, SENILE: ICD-10-CM

## 2024-01-10 DIAGNOSIS — M85.89 OSTEOPENIA OF MULTIPLE SITES: Primary | ICD-10-CM

## 2024-01-10 NOTE — TELEPHONE ENCOUNTER
So sorry --because Cecille David ordered this, the results went to her so I never saw them.  Her results are as follows:  T score in spine -2.3 ( also -2.3 in 2022); T score in L hip -2.2 (-2.0 in 2022) and T score in R hip -2.4 (-2.2 in 2022).  All scores are still in osteopenia range.  Not to the level of needing intervention at this time and will repeat bone scan in 2025.  Really important to keep up with her calcium (1000-1200mg per day) and vitamin D (800-1000IU per day) as well as regular weight bearing exercise.    Again, sorry for the delay

## 2024-01-10 NOTE — TELEPHONE ENCOUNTER
IMPRESSION: Low bone density (OSTEOPENIA). T score meets the WHO criteria for low bone density (osteopenia) at one or more measured sites. The risk of osteoporotic fracture increases approximately two-fold for each standard deviation decrease in T-score.     Pt wondering your thoughts on these results?  Routing pt [place] message to provider to advise.  Ish Nelson RN on 1/10/2024 at 8:56 AM

## 2024-09-30 ENCOUNTER — TELEPHONE (OUTPATIENT)
Dept: OBGYN | Facility: CLINIC | Age: 59
End: 2024-09-30
Payer: COMMERCIAL

## 2024-09-30 NOTE — TELEPHONE ENCOUNTER
Returned pt call  Pt states she has history of dense breast. Has done some research and has found that breast ultrasounds can be much more accurate than mammograms.  Pt wondering if she can have breast US instead of mammo for her preventative imaging. States if no mammo is done and US is coded as preventative it will be covered by insurance.   Discussed current practice with pt.    Routing pt [place] message to provider to advise.  Breast US?  Routing pt [place] message to provider to advise.

## 2024-09-30 NOTE — TELEPHONE ENCOUNTER
[facility] Call Center    Phone Message    May a detailed message be left on voicemail: yes     Reason for Call: Other: The patient is having a mammogram on 12/30 as well as a physical on that day with this provider  She has dense breasts and wondering if she can have a breast ultra sound instead of a mammogram as part of her preventative visit. The ultra sound would have to be coded as a preventative for insurance to cover.      Action Taken: Other: obgyn    Travel Screening: Not Applicable     Date of Service:

## 2024-10-02 NOTE — TELEPHONE ENCOUNTER
I am happy to address breast ca screening modalities with Lauren when we meet. Otherwise, it is not recommended nor part of guidelines to do anything other than mammogram in an average risk woman. As I have never seen her before, again, we will go over the literature, options, and insurance coverage when I see her.    Maria Del Carmen Ghotra Masters, DO

## 2024-10-02 NOTE — TELEPHONE ENCOUNTER
Returned call with pt  Will discuss plan for breast screening at visit with Dr Vargas  Pt verbalized understanding, in agreement with plan, and voiced no further questions.  Ish Nelson RN on 10/2/2024 at 12:40 PM   [facility]

## 2024-12-23 NOTE — PROGRESS NOTES
Lauren is a 59 year old  female who presents for annual exam.     Besides routine health maintenance, she has no other health concerns today .    HPI:  The patient's PCP is Dr Baez, will see regularly-starting in march    Is interested in other forms of additional breast ca screening due to her dense breast tissue. Worries her.  Menarche 20  First birth age 28  Min fhx of cancers, mat gf colon ca  LMP 56yo    Hx of vulvar lesion mentioned to her in past, has no symptoms.           GYNECOLOGIC HISTORY:    Patient's last menstrual period was 2020 (approximate).    Her current contraception method is: menopause.  She  reports that she has never smoked. She has never used smokeless tobacco.  Patient is not sexually active.  STD testing offered?  Declined  Last PHQ-9 score on record =       2023     8:34 AM   PHQ-9 SCORE   PHQ-9 Total Score 4     Last GAD7 score on record =       2023     8:34 AM   ERNESTO-7 SCORE   Total Score 1     Alcohol Score = 0    HEALTH MAINTENANCE:  Cholesterol: Followed by PCP last completed 22  Cholesterol   Date Value Ref Range Status   2014 197 115 - 199 mg/dL Final      Last Mammo: One year ago, Result: Normal, Next Mammo: Today ***  Pap:   Lab Results   Component Value Date    GYNINTERP  2022     Negative for Intraepithelial Lesion or Malignancy (NILM)    GYNINTERP  2021     Negative for Intraepithelial Lesion or Malignancy (NILM)    PAP NIL 10/27/2020    PAP NIL 10/14/2019    PAP OTHER-NIL, See Result 10/11/2018     HPV-    Colonoscopy:  2021, Result: Normal, Next Colonoscopy: 5 years.  Dexa:  25      HISTORY:  OB History    Para Term  AB Living   2 2 0 2 0 2   SAB IAB Ectopic Multiple Live Births   0 0 0 0 2      # Outcome Date GA Lbr Beni/2nd Weight Sex Type Anes PTL Lv   2   35w0d    Vag-Spont   NONI   1   35w0d    Vag-Spont   NONI       Patient Active Problem List   Diagnosis    Screening for  "cervical cancer    Osteopenia    Hormone replacement therapy     Past Surgical History:   Procedure Laterality Date    RECTOCELE REPAIR        Social History     Tobacco Use    Smoking status: Never    Smokeless tobacco: Never   Substance Use Topics    Alcohol use: No     Alcohol/week: 0.0 standard drinks of alcohol      Problem (# of Occurrences) Relation (Name,Age of Onset)    Parkinsonism (1) Mother    Dementia (1) Mother    Diabetes (1) Father    Coronary Artery Disease (1) Father    Colon Cancer (1) Maternal Grandfather    No Known Problems (6) Sister, Brother, Maternal Grandmother, Paternal Grandmother, Paternal Grandfather, Other              Current Outpatient Medications   Medication Sig Dispense Refill    calcipotriene (DOVONOX) 0.005 % external ointment Apply topically 2 times daily      clobetasol (TEMOVATE) 0.05 % external ointment        No current facility-administered medications for this visit.     No Known Allergies    Past medical, surgical, social and family histories were reviewed and updated in EPIC.        EXAM:  /76   Ht 1.615 m (5' 3.58\")   Wt 49.2 kg (108 lb 6.4 oz)   LMP 02/01/2020 (Approximate)   BMI 18.85 kg/m     BMI: Body mass index is 18.85 kg/m .    PHYSICAL EXAM:  Constitutional:   Appearance: Well nourished, well developed, alert, in no acute distress  Neck:  Lymph Nodes:  No lymphadenopathy present    Thyroid:  Gland size normal, nontender, no nodules or masses present  on palpation  Chest:  Respiratory Effort:  Breathing unlabored  Cardiovascular:    Heart: Auscultation:  Regular rate, normal rhythm, no murmurs present  Breasts: Inspection of Breasts:  No lymphadenopathy present., Palpation of Breasts and Axillae:  No masses present on palpation, no breast tenderness., Axillary Lymph Nodes:  No lymphadenopathy present., and No nodularity, asymmetry or nipple discharge bilaterally.  Gastrointestinal:   Abdominal Examination:  Abdomen nontender to palpation, tone normal " without rigidity or guarding, no masses present, umbilicus without lesions   Liver and Spleen:  No hepatomegaly present, liver nontender to palpation    Hernias:  No hernias present  Lymphatic: Lymph Nodes:  No other lymphadenopathy present  Skin:  General Inspection:  No rashes present, no lesions present, no areas of  discoloration  Neurologic:    Mental Status:  Oriented X3.  Normal strength and tone, sensory exam                grossly normal, mentation intact and speech normal.    Psychiatric:   Mentation appears normal and affect normal/bright.         Pelvic Exam:  External Genitalia:     Normal appearance for age, no discharge present, no tenderness present, no inflammatory lesions present, color normal  Vagina:     Normal vaginal vault without central or paravaginal defects, no discharge present, no inflammatory lesions present, no masses present  Bladder:     Nontender to palpation  Urethra:   Urethral Body:  Urethra palpation normal, urethra structural support normal   Urethral Meatus:  No erythema or lesions present  Cervix:     Appearance healthy, no lesions present, nontender to palpation, no bleeding present  Uterus:     Uterus: firm, normal sized and nontender, anteverted in position.   Adnexa:     No adnexal tenderness present, no adnexal masses present  Perineum:     Perineum within normal limits, no evidence of trauma, no rashes or skin lesions present  Anus:     Anus within normal limits, no hemorrhoids present  Inguinal Lymph Nodes:     No lymphadenopathy present  Pubic Hair:     Normal pubic hair distribution for age  Genitalia and Groin:     No rashes present, no lesions present, no areas of discoloration, no masses present    COUNSELING:   Reviewed preventive health counseling, as reflected in patient instructions       Osteoporosis prevention/bone health    BMI: Body mass index is 18.85 kg/m .      ASSESSMENT:  59 year old female with satisfactory annual exam.    ICD-10-CM    1. Encounter  for gynecological examination without abnormal finding  Z01.419 HPV and Gynecologic Cytology Panel - Recommended Age 30-65 Years          PLAN:  ***    Maria Del Carmen Ghotra Masters, DO

## 2024-12-24 ENCOUNTER — ANCILLARY PROCEDURE (OUTPATIENT)
Dept: BONE DENSITY | Facility: CLINIC | Age: 59
End: 2024-12-24
Attending: OBSTETRICS & GYNECOLOGY
Payer: COMMERCIAL

## 2024-12-24 DIAGNOSIS — M85.89 OSTEOPENIA OF MULTIPLE SITES: ICD-10-CM

## 2024-12-24 DIAGNOSIS — M85.80 OSTEOPENIA, SENILE: ICD-10-CM

## 2024-12-24 PROCEDURE — 77080 DXA BONE DENSITY AXIAL: CPT | Performed by: PHYSICIAN ASSISTANT

## 2024-12-30 ENCOUNTER — ANCILLARY PROCEDURE (OUTPATIENT)
Dept: MAMMOGRAPHY | Facility: CLINIC | Age: 59
End: 2024-12-30
Payer: COMMERCIAL

## 2024-12-30 ENCOUNTER — OFFICE VISIT (OUTPATIENT)
Dept: OBGYN | Facility: CLINIC | Age: 59
End: 2024-12-30
Payer: COMMERCIAL

## 2024-12-30 ENCOUNTER — TELEPHONE (OUTPATIENT)
Dept: OBGYN | Facility: CLINIC | Age: 59
End: 2024-12-30

## 2024-12-30 VITALS
HEIGHT: 64 IN | WEIGHT: 108.4 LBS | SYSTOLIC BLOOD PRESSURE: 118 MMHG | DIASTOLIC BLOOD PRESSURE: 76 MMHG | BODY MASS INDEX: 18.51 KG/M2

## 2024-12-30 DIAGNOSIS — Z12.31 VISIT FOR SCREENING MAMMOGRAM: ICD-10-CM

## 2024-12-30 DIAGNOSIS — Z01.419 ENCOUNTER FOR GYNECOLOGICAL EXAMINATION WITHOUT ABNORMAL FINDING: Primary | ICD-10-CM

## 2024-12-30 DIAGNOSIS — Z70.8 HUMAN PAPILLOMA VIRUS (HPV) COUNSELING: ICD-10-CM

## 2024-12-30 PROCEDURE — 99396 PREV VISIT EST AGE 40-64: CPT | Performed by: OBSTETRICS & GYNECOLOGY

## 2024-12-30 PROCEDURE — G0145 SCR C/V CYTO,THINLAYER,RESCR: HCPCS | Performed by: OBSTETRICS & GYNECOLOGY

## 2024-12-30 PROCEDURE — 87624 HPV HI-RISK TYP POOLED RSLT: CPT | Performed by: OBSTETRICS & GYNECOLOGY

## 2024-12-30 PROCEDURE — 99459 PELVIC EXAMINATION: CPT | Performed by: OBSTETRICS & GYNECOLOGY

## 2024-12-30 PROCEDURE — 77067 SCR MAMMO BI INCL CAD: CPT | Mod: TC | Performed by: RADIOLOGY

## 2024-12-30 PROCEDURE — 77063 BREAST TOMOSYNTHESIS BI: CPT | Mod: TC | Performed by: RADIOLOGY

## 2024-12-30 NOTE — TELEPHONE ENCOUNTER
Annual today 12/30 with Dr. Sam IBRAHIM notes incomplete as appointment was just today    Pts last DEXA 12/24/24, pt has been getting DEXA yearly     Letter on 12/27 from Dr. Jane to pt:    Lauren, continue Vitamin D, Calcium and exercise.    Your bones are fairly stable in the range of osteopenia.  Based on your results and risk factor calcuations, we do no recommend medications at this time but it is very important that we continue to monitor closely.  We will plan to repeat your bone scan in 2 years.  Keep working hard on getting adequate calcium and vitamin D as well as regular weight bearing exercise.     Relayed letter to pt that she will be due in 2 years    Pt verbalized understanding and reports no further questions    Hui Fenton RN on 12/30/2024 at 11:02 AM  [facility] Triage

## 2024-12-30 NOTE — PATIENT INSTRUCTIONS
-Daily total calcium intake (between food/supplements) should be 1200mg which equates to 5 servings calcium containing food per day; VItamin D 2000IU. Not only is this important for your bone health as you age, but also vitamin D has a role in decreasing breast cancer risk.   Foods rich in calcium are: milk, cheese, yogurt, seafood, sardines and canned salmon, leafy green vegetables such as anirudh greens, spinach and kale, beans and lentils, almonds, seeds (poppy, sesame, celery, sudha), rhubarb, dried fruit such as figs, whey protein, tofu and edamame, amaranth, other foods with added calcium such as orange juice and some cereals.   If adequate amount not taken in diet, then a supplement may be needed.     -I also recommend increasing your dietary fiber by starting Metamucil (powder mixed in glass of water) once to twice daily          Breast ultrasound:  [employer] at [phone]  [id number] bilateral breast ultrasound 4 quadrants

## 2024-12-30 NOTE — TELEPHONE ENCOUNTER
[facility] Call Center    Phone Message    May a detailed message be left on voicemail: yes     Reason for Call: Pt calling as she left her appointment before she finished discussing/scheduling her Dexa. No orders in chart. Pt wants to verify one should be done again 12/2025 and then get one scheduled.  Please call pt once orders placed or call to explain why she wouldn't get one in 2025 as she usually gets them yearly.    Thank you    Action Taken: Message routed to:  Other: [facility]    Travel Screening: Not Applicable    Aj Mae (Attending)

## 2024-12-31 LAB
HPV HR 12 DNA CVX QL NAA+PROBE: NEGATIVE
HPV16 DNA CVX QL NAA+PROBE: NEGATIVE
HPV18 DNA CVX QL NAA+PROBE: NEGATIVE
HUMAN PAPILLOMA VIRUS FINAL DIAGNOSIS: NORMAL

## 2025-01-06 LAB
BKR AP ASSOCIATED HPV REPORT: NORMAL
BKR LAB AP GYN ADEQUACY: NORMAL
BKR LAB AP GYN INTERPRETATION: NORMAL
BKR LAB AP PREVIOUS ABNORMAL: NORMAL
PATH REPORT.COMMENTS IMP SPEC: NORMAL
PATH REPORT.COMMENTS IMP SPEC: NORMAL
PATH REPORT.RELEVANT HX SPEC: NORMAL